# Patient Record
Sex: MALE | Race: WHITE | NOT HISPANIC OR LATINO | Employment: FULL TIME | URBAN - METROPOLITAN AREA
[De-identification: names, ages, dates, MRNs, and addresses within clinical notes are randomized per-mention and may not be internally consistent; named-entity substitution may affect disease eponyms.]

---

## 2017-04-24 ENCOUNTER — OFFICE VISIT (OUTPATIENT)
Dept: MEDICAL GROUP | Facility: MEDICAL CENTER | Age: 42
End: 2017-04-24
Payer: COMMERCIAL

## 2017-04-24 VITALS
RESPIRATION RATE: 16 BRPM | HEART RATE: 65 BPM | TEMPERATURE: 97.7 F | HEIGHT: 72 IN | OXYGEN SATURATION: 94 % | DIASTOLIC BLOOD PRESSURE: 70 MMHG | SYSTOLIC BLOOD PRESSURE: 112 MMHG | BODY MASS INDEX: 25.35 KG/M2 | WEIGHT: 187.2 LBS

## 2017-04-24 DIAGNOSIS — M79.644 FINGER PAIN, RIGHT: ICD-10-CM

## 2017-04-24 DIAGNOSIS — K20.0 EOSINOPHILIC ESOPHAGITIS: ICD-10-CM

## 2017-04-24 DIAGNOSIS — F32.A DEPRESSION, UNSPECIFIED DEPRESSION TYPE: ICD-10-CM

## 2017-04-24 DIAGNOSIS — J45.30 MILD PERSISTENT ASTHMA WITHOUT COMPLICATION: ICD-10-CM

## 2017-04-24 PROCEDURE — 99214 OFFICE O/P EST MOD 30 MIN: CPT | Performed by: PHYSICIAN ASSISTANT

## 2017-04-24 RX ORDER — ALBUTEROL SULFATE 90 UG/1
2 AEROSOL, METERED RESPIRATORY (INHALATION) EVERY 4 HOURS PRN
Qty: 8.5 G | Refills: 3 | Status: SHIPPED | OUTPATIENT
Start: 2017-04-24 | End: 2017-10-16

## 2017-04-24 NOTE — ASSESSMENT & PLAN NOTE
Removed a splinter from his right hand of his third digit about a year ago. Since then he's had intermittent issues with pain. Discomfort when grabbing things. Has been soaking the finger in Epsom salts. His concern is there is possibly a splinter in the finger.

## 2017-04-24 NOTE — PROGRESS NOTES
Subjective:   Raheel Kitchen Jr. is a 41 y.o. male here today to establish care with a new PCP in our office as well as to discuss possible recent issues with a depressed date.    Finger pain, right  Removed a splinter from his right hand of his third digit about a year ago. Since then he's had intermittent issues with pain. Discomfort when grabbing things. Has been soaking the finger in Epsom salts. His concern is there is possibly a splinter in the finger.    Depression  This is a 41-year-old male complains of a 2-3 week history of feeling sad. Symptoms came on out of the blue. He states that last week he had couple days where he didn't get out of bed. His wife called it laziness. States as a heaviness in his thoughts. He does go to work and he did mow the lawn this weekend. Appeared that on Thursday that this cloud was lifted from him. He has no history of significant depression or anxiety. Denies any homicidal or suicidal ideations. He is  with 3 children. States he has no reason to feel this way. No reason to feel unhappy. He has felt unhappy though for a while. He spoke to his father recently and his mother had depression. He did make an appointment with a behavioral health counselor or possibly a psychologist. Wanted to, to see if this was more of a physical and mental problem. She had labs done in October and they were within normal limits. Had a thyroid level checked which was normal.    Mild persistent asthma  Has a chronic history of asthma that is worse when he is sick. No recent illnesses. He does not have a rescue inhaler any longer. Usually will take Advair as well. Doesn't need to take Advair on a daily basis. Denies any current symptoms such as fevers, chest pain or shortness of breath.    Eosinophilic esophagitis  He followed up with GI and had a dilatation for esophageal stricture. He was told to take omeprazole for 2 months. He has current no symptoms. Feels good. Has no follow-up with GI  until he has symptoms of difficulty swallowing or feels heartburn or acid indigestion.       Current medicines (including changes today)  Current Outpatient Prescriptions   Medication Sig Dispense Refill   • albuterol 108 (90 BASE) MCG/ACT Aero Soln inhalation aerosol Inhale 2 Puffs by mouth every four hours as needed for Shortness of Breath. 8.5 g 3   • therapeutic multivitamin-minerals (THERAGRAN-M) Tab Take 1 Tab by mouth every day.       No current facility-administered medications for this visit.     He  has a past medical history of Esophageal stricture; Esophageal dilatation; Asthma; and Esophageal ring. He also has no past medical history of Diabetes (CMS-[facility]).    ROS   No chest pain, no shortness of breath, no abdominal pain and all other systems were reviewed and are negative.       Objective:     Blood pressure 112/70, pulse 65, temperature 36.5 °C (97.7 °F), resp. rate 16, height 1.829 m (6'), weight 84.913 kg (187 lb 3.2 oz), SpO2 94 %. Body mass index is 25.38 kg/(m^2).   Physical Exam:  Constitutional: Alert, no distress.  Skin: Warm, dry, good turgor, no rashes in visible areas. Right finger there is a lesion that is raised approximately 5 mm in diameter. There are some micro-abrasions noted on the top of this lesion. Nontender. No noted masses other than the skin lesion.  Eye: Equal, round and reactive, conjunctiva clear, lids normal.  ENMT: Lips without lesions, good dentition, oropharynx clear.  Neck: Trachea midline, no masses.   Lymph: No cervical or supraclavicular lymphadenopathy  Respiratory: Unlabored respiratory effort, lungs clear to auscultation, no wheezes, no ronchi.  Cardiovascular: Normal S1, S2, no murmur, no edema.  Abdomen: Soft, non-tender, no masses.  Psych: Alert and oriented x3, normal affect and mood.        Assessment and Plan:   The following treatment plan was discussed    1. Depression, unspecified depression type  New onset, acute condition. Follow-up with psychology.  Discussed with contacting me if symptoms become a daily concern. Then we will start oral medications.    2. Mild persistent asthma without complication  Chronic condition and stable. Prescribed albuterol rescue inhaler use as needed. May contact me if Advair is necessary. Discussed that being a maintenance drug.    3. Eosinophilic esophagitis  Chronic condition with resolution status post dilatation. Follow-up with any concerns such as dysphagia with me or his GI.    4. Finger pain, right  Status post splinter removal. There may be a splinter but would like to see the area heal first. Advised to use a cream and allow the sores on top to heal. If needed return and will perform an exploratory procedure.      Followup: Return if symptoms worsen or fail to improve.    Please note that this dictation was created using voice recognition software. I have made every reasonable attempt to correct obvious errors, but I expect that there are errors of grammar and possibly content that I did not discover before finalizing the note.

## 2017-04-24 NOTE — MR AVS SNAPSHOT
Raheel Kitchen    2017 7:00 AM   Office Visit   MRN: [id number]    Department:  [place] [address]   Dept Phone:  [phone]    Description:  Male : 1975   Provider:  Toan Mcelroy PA-C           Reason for Visit     Establish Care mental concerns, splinter on R hand 3rd digit       Allergies as of 2017     No Known Allergies      You were diagnosed with     Depression, unspecified depression type   [7464623]       Mild persistent asthma without complication   [783283]       Finger pain, right   [509165]       Eosinophilic esophagitis   [530.13.ICD-9-CM]         Vital Signs     Blood Pressure Pulse Temperature Respirations Height Weight    112/70 mmHg 65 36.5 °C (97.7 °F) 16 1.829 m (6') 84.913 kg (187 lb 3.2 oz)    Body Mass Index Oxygen Saturation Smoking Status             25.38 kg/m2 94% Never Smoker          Basic Information     Date Of Birth Sex Race Ethnicity Preferred Language    1975 Male White Non- English      Your appointments     Oct 09, 2017  7:00 AM   ANNUAL EXAM PREVENTATIVE with Toan Mcelroy PA-C   [facility]    [address]  [address]  [place] [address] [phone]              Problem List              ICD-10-CM Priority Class Noted - Resolved    Eosinophilic esophagitis K20.0   10/7/2016 - Present    Mild persistent asthma J45.30   10/7/2016 - Present    Health care maintenance Z00.00   10/7/2016 - Present    Depression F32.9   2017 - Present    Finger pain, right M79.644   2017 - Present      Health Maintenance        Date Due Completion Dates    IMM DTaP/Tdap/Td Vaccine (1 - Tdap) 1992 ---    IMM PNEUMOCOCCAL 19-64 (ADULT) MEDIUM RISK SERIES (1 of 1 - PPSV23) 1992 ---            Current Immunizations     No immunizations on file.      Below and/or attached are the medications your provider expects you to take. Review all of your home medications and newly ordered  medications with your provider and/or pharmacist. Follow medication instructions as directed by your provider and/or pharmacist. Please keep your medication list with you and share with your provider. Update the information when medications are discontinued, doses are changed, or new medications (including over-the-counter products) are added; and carry medication information at all times in the event of emergency situations     Allergies:  No Known Allergies          Medications  Valid as of: April 24, 2017 -  7:35 AM    Generic Name Brand Name Tablet Size Instructions for use    Albuterol Sulfate (Aero Soln) albuterol 108 (90 BASE) MCG/ACT Inhale 2 Puffs by mouth every four hours as needed for Shortness of Breath.        Multiple Vitamins-Minerals (Tab) THERAGRAN-M  Take 1 Tab by mouth every day.        .                 Medicines prescribed today were sent to:     [facility] [address] [place] [address], [place] - [address]    [address] [place] [address]    Phone: [phone] Fax: [phone]    Open 24 Hours?: No      Medication refill instructions:       If your prescription bottle indicates you have medication refills left, it is not necessary to call your provider’s office. Please contact your pharmacy and they will refill your medication.    If your prescription bottle indicates you do not have any refills left, you may request refills at any time through one of the following ways: The online [employer] system (except Urgent Care), by calling your provider’s office, or by asking your pharmacy to contact your provider’s office with a refill request. Medication refills are processed only during regular business hours and may not be available until the next business day. Your provider may request additional information or to have a follow-up visit with you prior to refilling your medication.   *Please Note: Medication refills are assigned a new Rx number when refilled electronically. Your pharmacy may  indicate that no refills were authorized even though a new prescription for the same medication is available at the pharmacy. Please request the medicine by name with the pharmacy before contacting your provider for a refill.           [employer] Access Code: Activation code not generated  Current [employer] Status: Active

## 2017-04-24 NOTE — ASSESSMENT & PLAN NOTE
He followed up with GI and had a dilatation for esophageal stricture. He was told to take omeprazole for 2 months. He has current no symptoms. Feels good. Has no follow-up with GI until he has symptoms of difficulty swallowing or feels heartburn or acid indigestion.

## 2017-04-24 NOTE — ASSESSMENT & PLAN NOTE
Has a chronic history of asthma that is worse when he is sick. No recent illnesses. He does not have a rescue inhaler any longer. Usually will take Advair as well. Doesn't need to take Advair on a daily basis. Denies any current symptoms such as fevers, chest pain or shortness of breath.

## 2017-04-24 NOTE — ASSESSMENT & PLAN NOTE
This is a 41-year-old male complains of a 2-3 week history of feeling sad. Symptoms came on out of the blue. He states that last week he had couple days where he didn't get out of bed. His wife called it laziness. States as a heaviness in his thoughts. He does go to work and he did mow the lawn this weekend. Appeared that on Thursday that this cloud was lifted from him. He has no history of significant depression or anxiety. Denies any homicidal or suicidal ideations. He is  with 3 children. States he has no reason to feel this way. No reason to feel unhappy. He has felt unhappy though for a while. He spoke to his father recently and his mother had depression. He did make an appointment with a behavioral health counselor or possibly a psychologist. Wanted to, to see if this was more of a physical and mental problem. She had labs done in October and they were within normal limits. Had a thyroid level checked which was normal.

## 2017-05-30 ENCOUNTER — OFFICE VISIT (OUTPATIENT)
Dept: MEDICAL GROUP | Facility: MEDICAL CENTER | Age: 42
End: 2017-05-30
Payer: COMMERCIAL

## 2017-05-30 VITALS
DIASTOLIC BLOOD PRESSURE: 74 MMHG | BODY MASS INDEX: 24.78 KG/M2 | HEART RATE: 65 BPM | HEIGHT: 72 IN | OXYGEN SATURATION: 94 % | WEIGHT: 182.98 LBS | TEMPERATURE: 97.2 F | RESPIRATION RATE: 16 BRPM | SYSTOLIC BLOOD PRESSURE: 110 MMHG

## 2017-05-30 DIAGNOSIS — F32.A DEPRESSION, UNSPECIFIED DEPRESSION TYPE: ICD-10-CM

## 2017-05-30 PROCEDURE — 99214 OFFICE O/P EST MOD 30 MIN: CPT | Performed by: PHYSICIAN ASSISTANT

## 2017-05-30 RX ORDER — ESCITALOPRAM OXALATE 10 MG/1
10 TABLET ORAL DAILY
Qty: 30 TAB | Refills: 0 | Status: SHIPPED | OUTPATIENT
Start: 2017-05-30 | End: 2017-06-12 | Stop reason: SDUPTHER

## 2017-05-30 NOTE — MR AVS SNAPSHOT
Raheel Kitchen    2017 11:00 AM   Office Visit   MRN: [id number]    Department:  [place] [address]   Dept Phone:  [phone]    Description:  Male : 1975   Provider:  Jesus Aragon PA-C           Reason for Visit     Mental Heatlh Problem           Allergies as of 2017     No Known Allergies      You were diagnosed with     Depression, unspecified depression type   [[id number]]         Vital Signs     Blood Pressure Pulse Temperature Respirations Height Weight    110/74 mmHg 65 36.2 °C (97.2 °F) 16 1.829 m (6') 83 kg (182 lb 15.7 oz)    Body Mass Index Oxygen Saturation Smoking Status             24.81 kg/m2 94% Never Smoker          Basic Information     Date Of Birth Sex Race Ethnicity Preferred Language    1975 Male White Non- English      Your appointments     2017  3:00 PM   Established Patient with Jesus Aragon PA-C   [facility]    [address]  [address]  [place] [id number] [phone]           You will be receiving a confirmation call a few days before your appointment from our automated call confirmation system.            Oct 09, 2017  7:00 AM   ANNUAL EXAM PREVENTATIVE with Toan Mcelroy PA-C   [facility]    [address]  [address]  [place] [id number] [phone]              Problem List              ICD-10-CM Priority Class Noted - Resolved    Eosinophilic esophagitis K20.0   10/7/2016 - Present    Mild persistent asthma J45.30   10/7/2016 - Present    Health care maintenance Z00.00   10/7/2016 - Present    Depression F32.9   2017 - Present    Finger pain, right M79.644   2017 - Present      Health Maintenance        Date Due Completion Dates    IMM DTaP/Tdap/Td Vaccine (1 - Tdap) 1992 ---    IMM PNEUMOCOCCAL 19-64 (ADULT) MEDIUM RISK SERIES (1 of 1 - PPSV23) 1992 ---            Current Immunizations     No  immunizations on file.      Below and/or attached are the medications your provider expects you to take. Review all of your home medications and newly ordered medications with your provider and/or pharmacist. Follow medication instructions as directed by your provider and/or pharmacist. Please keep your medication list with you and share with your provider. Update the information when medications are discontinued, doses are changed, or new medications (including over-the-counter products) are added; and carry medication information at all times in the event of emergency situations     Allergies:  No Known Allergies          Medications  Valid as of: May 30, 2017 - 11:28 AM    Generic Name Brand Name Tablet Size Instructions for use    Albuterol Sulfate (Aero Soln) albuterol 108 (90 BASE) MCG/ACT Inhale 2 Puffs by mouth every four hours as needed for Shortness of Breath.        Escitalopram Oxalate (Tab) LEXAPRO 10 MG Take 1 Tab by mouth every day.        Multiple Vitamins-Minerals (Tab) THERAGRAN-M  Take 1 Tab by mouth every day.        .                 Medicines prescribed today were sent to:     [id number] - [place], [place] - [address]    [address] [place] [address]    Phone: [phone] Fax: [phone]    Open 24 Hours?: No      Medication refill instructions:       If your prescription bottle indicates you have medication refills left, it is not necessary to call your provider’s office. Please contact your pharmacy and they will refill your medication.    If your prescription bottle indicates you do not have any refills left, you may request refills at any time through one of the following ways: The online [employer] system (except Urgent Care), by calling your provider’s office, or by asking your pharmacy to contact your provider’s office with a refill request. Medication refills are processed only during regular business hours and may not be available until the next business day. Your  provider may request additional information or to have a follow-up visit with you prior to refilling your medication.   *Please Note: Medication refills are assigned a new Rx number when refilled electronically. Your pharmacy may indicate that no refills were authorized even though a new prescription for the same medication is available at the pharmacy. Please request the medicine by name with the pharmacy before contacting your provider for a refill.        Instructions    Escitalopram tablets  What is this medicine?  ESCITALOPRAM (es sye DILCIA oh pram) is used to treat depression and certain types of anxiety.  This medicine may be used for other purposes; ask your health care provider or pharmacist if you have questions.  COMMON BRAND NAME(S): Lexapro  What should I tell my health care provider before I take this medicine?  They need to know if you have any of these conditions:  -bipolar disorder or a family history of bipolar disorder  -diabetes  -heart disease  -kidney or liver disease  -receiving electroconvulsive therapy  -seizures (convulsions)  -suicidal thoughts, plans, or attempt by you or a family member  -an unusual or allergic reaction to escitalopram, the related drug citalopram, other medicines, foods, dyes, or preservatives  -pregnant or trying to become pregnant  -breast-feeding  How should I use this medicine?  Take this medicine by mouth with a glass of water. Follow the directions on the prescription label. You can take it with or without food. If it upsets your stomach, take it with food. Take your medicine at regular intervals. Do not take it more often than directed. Do not stop taking this medicine suddenly except upon the advice of your doctor. Stopping this medicine too quickly may cause serious side effects or your condition may worsen.  A special MedGuide will be given to you by the pharmacist with each prescription and refill. Be sure to read this information carefully each time.  Talk to  your pediatrician regarding the use of this medicine in children. Special care may be needed.  Overdosage: If you think you have taken too much of this medicine contact a poison control center or emergency room at once.  NOTE: This medicine is only for you. Do not share this medicine with others.  What if I miss a dose?  If you miss a dose, take it as soon as you can. If it is almost time for your next dose, take only that dose. Do not take double or extra doses.  What may interact with this medicine?  Do not take this medicine with any of the following medications:  -cisapride  -citalopram  -linezolid  -MAOIs like Carbex, Eldepryl, Marplan, Nardil, and Parnate  -methylene blue (injected into a vein)  -pimozide  This medicine may also interact with the following medications:  -alcohol  -aspirin and aspirin-like medicines  -carbamazepine  -certain medicines for depression, anxiety, or psychotic disturbances  -certain medicines for migraine headache like almotriptan, eletriptan, frovatriptan, naratriptan, rizatriptan, sumatriptan, zolmitriptan  -cimetidine  -diuretics  -fentanyl  -furazolidone  -isoniazid  -ketoconazole  -lithium  -medicines that treat or prevent blood clots like warfarin, enoxaparin, and dalteparin  -medicines for sleep  -metoprolol  -NSAIDs, medicines for pain and inflammation, like ibuprofen or naproxen  -procarbazine  -rasagiline  -supplements like Progress's wort, kava kava, valerian  -tramadol  -tryptophan  This list may not describe all possible interactions. Give your health care provider a list of all the medicines, herbs, non-prescription drugs, or dietary supplements you use. Also tell them if you smoke, drink alcohol, or use illegal drugs. Some items may interact with your medicine.  What should I watch for while using this medicine?  Tell your doctor if your symptoms do not get better or if they get worse. Visit your doctor or health care professional for regular checks on your progress.  Because it may take several weeks to see the full effects of this medicine, it is important to continue your treatment as prescribed by your doctor.  Patients and their families should watch out for new or worsening thoughts of suicide or depression. Also watch out for sudden changes in feelings such as feeling anxious, agitated, panicky, irritable, hostile, aggressive, impulsive, severely restless, overly excited and hyperactive, or not being able to sleep. If this happens, especially at the beginning of treatment or after a change in dose, call your health care professional.  You may get drowsy or dizzy. Do not drive, use machinery, or do anything that needs mental alertness until you know how this medicine affects you. Do not stand or sit up quickly, especially if you are an older patient. This reduces the risk of dizzy or fainting spells. Alcohol may interfere with the effect of this medicine. Avoid alcoholic drinks.  Your mouth may get dry. Chewing sugarless gum or sucking hard candy, and drinking plenty of water may help. Contact your doctor if the problem does not go away or is severe.  What side effects may I notice from receiving this medicine?  Side effects that you should report to your doctor or health care professional as soon as possible:  -allergic reactions like skin rash, itching or hives, swelling of the face, lips, or tongue  -confusion  -feeling faint or lightheaded, falls  -fast talking and excited feelings or actions that are out of control  -hallucination, loss of contact with reality  -seizures  -suicidal thoughts or other mood changes  -unusual bleeding or bruising  Side effects that usually do not require medical attention (report to your doctor or health care professional if they continue or are bothersome):  -blurred vision  -changes in appetite  -change in sex drive or performance  -headache  -increased sweating  -nausea  This list may not describe all possible side effects. Call your  doctor for medical advice about side effects. You may report side effects to FDA at 1-495-QDW-9041.  Where should I keep my medicine?  Keep out of reach of children.  Store at room temperature between 15 and 30 degrees C (59 and 86 degrees F). Throw away any unused medicine after the expiration date.  NOTE: This sheet is a summary. It may not cover all possible information. If you have questions about this medicine, talk to your doctor, pharmacist, or health care provider.  © 2014, Elsevier/Gold Standard. (5/3/2013 7:12:08 PM)            [employer] Access Code: Activation code not generated  Current registracija vozila Status: Active

## 2017-05-30 NOTE — ASSESSMENT & PLAN NOTE
Patient states positive history of increasingly worsening depression that has been going on since March 2017. Patient states that he's had issues with regards to lack of energy, lack of motivation, decreased interest in doing pleasurable tasks. Patient states that he did follow up with PCP April 24, 2017. Patient was referred to behavioral health. She mentions she developed a psychologist Lara Long. Patient states that no diagnosis has been formally made. Patient mentions no recommendations were made. Patient states positive increased sleep and fatigue. Patient states lack of appetite. Positive suicidal ideations, no specific plan. Denies homicidal ideations. No auditory, visual or tactile hallucinations.

## 2017-05-30 NOTE — PATIENT INSTRUCTIONS
Escitalopram tablets  What is this medicine?  ESCITALOPRAM (es sye DILCIA oh pram) is used to treat depression and certain types of anxiety.  This medicine may be used for other purposes; ask your health care provider or pharmacist if you have questions.  COMMON BRAND NAME(S): Lexapro  What should I tell my health care provider before I take this medicine?  They need to know if you have any of these conditions:  -bipolar disorder or a family history of bipolar disorder  -diabetes  -heart disease  -kidney or liver disease  -receiving electroconvulsive therapy  -seizures (convulsions)  -suicidal thoughts, plans, or attempt by you or a family member  -an unusual or allergic reaction to escitalopram, the related drug citalopram, other medicines, foods, dyes, or preservatives  -pregnant or trying to become pregnant  -breast-feeding  How should I use this medicine?  Take this medicine by mouth with a glass of water. Follow the directions on the prescription label. You can take it with or without food. If it upsets your stomach, take it with food. Take your medicine at regular intervals. Do not take it more often than directed. Do not stop taking this medicine suddenly except upon the advice of your doctor. Stopping this medicine too quickly may cause serious side effects or your condition may worsen.  A special MedGuide will be given to you by the pharmacist with each prescription and refill. Be sure to read this information carefully each time.  Talk to your pediatrician regarding the use of this medicine in children. Special care may be needed.  Overdosage: If you think you have taken too much of this medicine contact a poison control center or emergency room at once.  NOTE: This medicine is only for you. Do not share this medicine with others.  What if I miss a dose?  If you miss a dose, take it as soon as you can. If it is almost time for your next dose, take only that dose. Do not take double or extra doses.  What may  interact with this medicine?  Do not take this medicine with any of the following medications:  -cisapride  -citalopram  -linezolid  -MAOIs like Carbex, Eldepryl, Marplan, Nardil, and Parnate  -methylene blue (injected into a vein)  -pimozide  This medicine may also interact with the following medications:  -alcohol  -aspirin and aspirin-like medicines  -carbamazepine  -certain medicines for depression, anxiety, or psychotic disturbances  -certain medicines for migraine headache like almotriptan, eletriptan, frovatriptan, naratriptan, rizatriptan, sumatriptan, zolmitriptan  -cimetidine  -diuretics  -fentanyl  -furazolidone  -isoniazid  -ketoconazole  -lithium  -medicines that treat or prevent blood clots like warfarin, enoxaparin, and dalteparin  -medicines for sleep  -metoprolol  -NSAIDs, medicines for pain and inflammation, like ibuprofen or naproxen  -procarbazine  -rasagiline  -supplements like [place]'s wort, kava kava, valerian  -tramadol  -tryptophan  This list may not describe all possible interactions. Give your health care provider a list of all the medicines, herbs, non-prescription drugs, or dietary supplements you use. Also tell them if you smoke, drink alcohol, or use illegal drugs. Some items may interact with your medicine.  What should I watch for while using this medicine?  Tell your doctor if your symptoms do not get better or if they get worse. Visit your doctor or health care professional for regular checks on your progress. Because it may take several weeks to see the full effects of this medicine, it is important to continue your treatment as prescribed by your doctor.  Patients and their families should watch out for new or worsening thoughts of suicide or depression. Also watch out for sudden changes in feelings such as feeling anxious, agitated, panicky, irritable, hostile, aggressive, impulsive, severely restless, overly excited and hyperactive, or not being able to sleep. If this  happens, especially at the beginning of treatment or after a change in dose, call your health care professional.  You may get drowsy or dizzy. Do not drive, use machinery, or do anything that needs mental alertness until you know how this medicine affects you. Do not stand or sit up quickly, especially if you are an older patient. This reduces the risk of dizzy or fainting spells. Alcohol may interfere with the effect of this medicine. Avoid alcoholic drinks.  Your mouth may get dry. Chewing sugarless gum or sucking hard candy, and drinking plenty of water may help. Contact your doctor if the problem does not go away or is severe.  What side effects may I notice from receiving this medicine?  Side effects that you should report to your doctor or health care professional as soon as possible:  -allergic reactions like skin rash, itching or hives, swelling of the face, lips, or tongue  -confusion  -feeling faint or lightheaded, falls  -fast talking and excited feelings or actions that are out of control  -hallucination, loss of contact with reality  -seizures  -suicidal thoughts or other mood changes  -unusual bleeding or bruising  Side effects that usually do not require medical attention (report to your doctor or health care professional if they continue or are bothersome):  -blurred vision  -changes in appetite  -change in sex drive or performance  -headache  -increased sweating  -nausea  This list may not describe all possible side effects. Call your doctor for medical advice about side effects. You may report side effects to FDA at 3-563-FDA-8648.  Where should I keep my medicine?  Keep out of reach of children.  Store at room temperature between 15 and 30 degrees C (59 and 86 degrees F). Throw away any unused medicine after the expiration date.  NOTE: This sheet is a summary. It may not cover all possible information. If you have questions about this medicine, talk to your doctor, pharmacist, or health care  provider.  © 2014, [employer]. (5/3/2013 7:12:08 PM)

## 2017-05-30 NOTE — PROGRESS NOTES
Chief Complaint   Patient presents with   • Mental Heatlh Problem       HISTORY OF PRESENT ILLNESS: Patient is a 42 y.o. male established patient who presents today to discuss depression    Depression  Patient states positive history of increasingly worsening depression that has been going on since March 2017. Patient states that he's had issues with regards to lack of energy, lack of motivation, decreased interest in doing pleasurable tasks. Patient states that he did follow up with PCP April 24, 2017. Patient was referred to behavioral health. She mentions she developed a psychologist Lara Long. Patient states that no diagnosis has been formally made. Patient mentions no recommendations were made. Patient states positive increased sleep and fatigue. Patient states lack of appetite. Positive suicidal ideations, no specific plan. Denies homicidal ideations. No auditory, visual or tactile hallucinations.     Patient Active Problem List    Diagnosis Date Noted   • Depression 04/24/2017   • Finger pain, right 04/24/2017   • Eosinophilic esophagitis 10/07/2016   • Mild persistent asthma 10/07/2016   • Health care maintenance 10/07/2016       Allergies:Review of patient's allergies indicates no known allergies.    Current Outpatient Prescriptions   Medication Sig Dispense Refill   • escitalopram (LEXAPRO) 10 MG Tab Take 1 Tab by mouth every day. 30 Tab 0   • albuterol 108 (90 BASE) MCG/ACT Aero Soln inhalation aerosol Inhale 2 Puffs by mouth every four hours as needed for Shortness of Breath. 8.5 g 3   • therapeutic multivitamin-minerals (THERAGRAN-M) Tab Take 1 Tab by mouth every day.       No current facility-administered medications for this visit.       Social History   Substance Use Topics   • Smoking status: Never Smoker    • Smokeless tobacco: Never Used   • Alcohol Use: 0.0 oz/week     0 Standard drinks or equivalent per week      Comment: rare        Family Status   Relation Status Death Age   • Mother      • Father Alive      Family History   Problem Relation Age of Onset   • Cancer Mother      pancreatic cancer   • Heart Disease Father        Review of Systems:   Constitutional: Negative for fever, chills, weight loss and malaise/fatigue.   HENT: Negative for ear pain, nosebleeds, congestion, sore throat and neck pain.    Eyes: Negative for blurred vision.   Respiratory: Negative for cough, sputum production, shortness of breath and wheezing.    Cardiovascular: Negative for chest pain, palpitations, orthopnea and leg swelling.   Gastrointestinal: Negative for heartburn, nausea, vomiting and abdominal pain.   Genitourinary: Negative for dysuria, urgency and frequency.   Musculoskeletal: Negative for myalgias, back pain and joint pain.   Skin: Negative for rash and itching.   Neurological: Negative for dizziness, tingling, tremors, sensory change, focal weakness and headaches.   Endo/Heme/Allergies: Does not bruise/bleed easily.   Psychiatric/Behavioral: Positive for depression. Negative memory loss.  The patient is not nervous/anxious and does not have insomnia.  . Positive mild suicidal ideations.   All other systems reviewed and are negative except as in HPI.    Exam:  Blood pressure 110/74, pulse 65, temperature 36.2 °C (97.2 °F), resp. rate 16, height 1.829 m (6'), weight 83 kg (182 lb 15.7 oz), SpO2 94 %.  General:  Well nourished, well developed male in NAD  Head: is grossly normal.  Neck: Supple without JVD or bruit. Thyroid is not enlarged.  Pulmonary: Clear to ausculation. Normal effort. No rales, ronchi, or wheezing.  Cardiovascular: Regular rate and rhythm without murmur. Carotid and radial pulses are intact and equal bilaterally.  Extremities: no clubbing, cyanosis, or edema.  Psychiatric: Positive depression. Positive tearful during evaluation. Slightly flat affect.    Medical decision-making and discussion: 41-year-old male presents to clinic to discuss depression. At such time to discuss  further treatment options and patient wishes medicinal management and intervention. Patient does present to clinic today with his wife who is a positive social support. Discussed face treatment options and I'm going to initiate Lexapro 10 mg once a day for the next 7-14 days. Reevaluation in such time for continuation of medication if deemed appropriate. Discussed this patient over the next few weeks until we have patient on the proper medication and dosage will be reevaluating every 1-2 weeks. In the interim patient is advised to go to the emergency room if symptoms are getting worse.     Please note that this dictation was created using voice recognition software. I have made every reasonable attempt to correct obvious errors, but I expect that there are errors of grammar and possibly content that I did not discover before finalizing the note.    Assessment/Plan:  1. Depression, unspecified depression type  escitalopram (LEXAPRO) 10 MG Tab

## 2017-06-12 ENCOUNTER — OFFICE VISIT (OUTPATIENT)
Dept: MEDICAL GROUP | Facility: MEDICAL CENTER | Age: 42
End: 2017-06-12
Payer: COMMERCIAL

## 2017-06-12 VITALS
OXYGEN SATURATION: 96 % | HEART RATE: 74 BPM | DIASTOLIC BLOOD PRESSURE: 86 MMHG | RESPIRATION RATE: 12 BRPM | WEIGHT: 186.07 LBS | BODY MASS INDEX: 25.2 KG/M2 | HEIGHT: 72 IN | TEMPERATURE: 97.6 F | SYSTOLIC BLOOD PRESSURE: 110 MMHG

## 2017-06-12 DIAGNOSIS — F32.A DEPRESSION, UNSPECIFIED DEPRESSION TYPE: ICD-10-CM

## 2017-06-12 PROCEDURE — 99214 OFFICE O/P EST MOD 30 MIN: CPT | Performed by: PHYSICIAN ASSISTANT

## 2017-06-12 RX ORDER — ESCITALOPRAM OXALATE 10 MG/1
15 TABLET ORAL DAILY
Qty: 45 TAB | Refills: 1 | Status: SHIPPED | OUTPATIENT
Start: 2017-06-12 | End: 2017-08-22 | Stop reason: SDUPTHER

## 2017-06-12 NOTE — ASSESSMENT & PLAN NOTE
Patient states positive depression. Patient states depression may have been there since January 2017, possibly even significantly longer. Patient states positive significant family history of depression. During a previous encounter and discussion May 30, 2017 patient was started on Lexapro 10 mg daily basis. She states the first week patient took medication he did notice that he was feeling slightly better, did have more energy. Patient states during the second week of taking medication patient states he was no longer dwelling on certain issues. Patient states that he has been able to handle more daily activities with ease and able to work through a lot of activities significantly concur. Patient states previously he was swelling on specific issues for significant period of time. Patient states now he is able to move through them very time efficient manner. In discussion with patient states no homicidal or suicidal ideations. Previously positive suicidal ideations, with no specific plan. Currently states doing better with no plan, nor no suicidal ideations. In discussion with patient states his brother also is on Lexapro. Patient states that he also discussed with a family member that is a physician who states medication is an excellent choice for patient's medical condition. Patient states since being seen last 2 weeks ago also is noted that a number of family members that have anxiety and depression are also being medicinally managed. Patient states that he would like to have medication increased.

## 2017-06-12 NOTE — PROGRESS NOTES
Chief Complaint   Patient presents with   • Follow-Up       HISTORY OF PRESENT ILLNESS: Patient is a 42 y.o. male established patient who presents today to discuss anxiety depression    Depression  Patient states positive depression. Patient states depression may have been there since January 2017, possibly even significantly longer. Patient states positive significant family history of depression. During a previous encounter and discussion May 30, 2017 patient was started on Lexapro 10 mg daily basis. She states the first week patient took medication he did notice that he was feeling slightly better, did have more energy. Patient states during the second week of taking medication patient states he was no longer dwelling on certain issues. Patient states that he has been able to handle more daily activities with ease and able to work through a lot of activities significantly concur. Patient states previously he was swelling on specific issues for significant period of time. Patient states now he is able to move through them very time efficient manner. In discussion with patient states no homicidal or suicidal ideations. Previously positive suicidal ideations, with no specific plan. Currently states doing better with no plan, nor no suicidal ideations. In discussion with patient states his brother also is on Lexapro. Patient states that he also discussed with a family member that is a physician who states medication is an excellent choice for patient's medical condition. Patient states since being seen last 2 weeks ago also is noted that a number of family members that have anxiety and depression are also being medicinally managed. Patient states that he would like to have medication increased.       Patient Active Problem List    Diagnosis Date Noted   • Depression 04/24/2017   • Finger pain, right 04/24/2017   • Eosinophilic esophagitis 10/07/2016   • Mild persistent asthma 10/07/2016   • Health care maintenance  10/07/2016       Allergies:Review of patient's allergies indicates no known allergies.    Current Outpatient Prescriptions   Medication Sig Dispense Refill   • escitalopram (LEXAPRO) 10 MG Tab Take 1.5 Tabs by mouth every day. 45 Tab 1   • albuterol 108 (90 BASE) MCG/ACT Aero Soln inhalation aerosol Inhale 2 Puffs by mouth every four hours as needed for Shortness of Breath. 8.5 g 3   • therapeutic multivitamin-minerals (THERAGRAN-M) Tab Take 1 Tab by mouth every day.       No current facility-administered medications for this visit.       Social History   Substance Use Topics   • Smoking status: Never Smoker    • Smokeless tobacco: Never Used   • Alcohol Use: 0.0 oz/week     0 Standard drinks or equivalent per week      Comment: rare        Family Status   Relation Status Death Age   • Mother     • Father Alive      Family History   Problem Relation Age of Onset   • Cancer Mother      pancreatic cancer   • Heart Disease Father        Review of Systems:   Constitutional: Negative for fever, chills, weight loss and malaise/fatigue.   HENT: Negative for ear pain, nosebleeds, congestion, sore throat and neck pain.    Eyes: Negative for blurred vision.   Respiratory: Negative for cough, sputum production, shortness of breath and wheezing.    Cardiovascular: Negative for chest pain, palpitations, orthopnea and leg swelling.   Gastrointestinal: Negative for heartburn, nausea, vomiting and abdominal pain.   Genitourinary: Negative for dysuria, urgency and frequency.   Musculoskeletal: Negative for myalgias, back pain and joint pain.   Skin: Negative for rash and itching.   Neurological: Negative for dizziness, tingling, tremors, sensory change, focal weakness and headaches.   Endo/Heme/Allergies: Does not bruise/bleed easily.   Psychiatric/Behavioral: Improved for depression. Denies suicidal ideas and memory loss.  The patient is improved for nervous/anxious and does not have insomnia.    All other systems reviewed  "and are negative except as in HPI.    Exam:  Blood pressure 110/86, pulse 74, temperature 36.4 °C (97.6 °F), resp. rate 12, height 1.829 m (6' 0.01\"), weight 84.4 kg (186 lb 1.1 oz), SpO2 96 %.  General:  Well nourished, well developed male in NAD  Head: is grossly normal.  Neck: Supple without JVD or bruit. Thyroid is not enlarged.  Pulmonary: Clear to ausculation. Normal effort. No rales, ronchi, or wheezing.  Cardiovascular: Regular rate and rhythm without murmur. Carotid and radial pulses are intact and equal bilaterally.  Extremities: no clubbing, cyanosis, or edema.  Psychiatric: Improved from previous. Good eye contact. No flight of ideas. Dress is appropriate for occasion. Speech is appropriate for occasion. No homicidal or suicidal ideations. Denies auditory, visual or tactile lacerations.    Medical decision-making and discussion: A 42-year-old male presents to clinic today to discuss anxiety depression. Patient doing significantly better and improving. At such time we are going to increase dose to Lexapro 15 mg daily basis. Your precautions given to patient. Advised patient to follow up with therapist    Please note that this dictation was created using voice recognition software. I have made every reasonable attempt to correct obvious errors, but I expect that there are errors of grammar and possibly content that I did not discover before finalizing the note.    Assessment/Plan:  1. Depression, unspecified depression type  escitalopram (LEXAPRO) 10 MG Tab            "

## 2017-06-12 NOTE — MR AVS SNAPSHOT
"        Raheel Kitchen Jr.   2017 3:00 PM   Office Visit   MRN: [id number]    Department:  [place] [address]   Dept Phone:  [phone]    Description:  Male : 1975   Provider:  Jesus Aragon PA-C           Reason for Visit     Follow-Up           Allergies as of 2017     No Known Allergies      You were diagnosed with     Depression, unspecified depression type   [[id number]]         Vital Signs     Blood Pressure Pulse Temperature Respirations Height Weight    110/86 mmHg 74 36.4 °C (97.6 °F) 12 1.829 m (6' 0.01\") 84.4 kg (186 lb 1.1 oz)    Body Mass Index Oxygen Saturation Smoking Status             25.23 kg/m2 96% Never Smoker          Basic Information     Date Of Birth Sex Race Ethnicity Preferred Language    1975 Male White Non- English      Your appointments     Oct 09, 2017  7:00 AM   ANNUAL EXAM PREVENTATIVE with Toan Mcelroy PA-C   [facility]    [address]  [address]  [place] [address]   [phone]              Problem List              ICD-10-CM Priority Class Noted - Resolved    Eosinophilic esophagitis K20.0   10/7/2016 - Present    Mild persistent asthma J45.30   10/7/2016 - Present    Health care maintenance Z00.00   10/7/2016 - Present    Depression F32.9   2017 - Present    Finger pain, right M79.644   2017 - Present      Health Maintenance        Date Due Completion Dates    IMM DTaP/Tdap/Td Vaccine (1 - Tdap) 1992 ---    IMM PNEUMOCOCCAL 19-64 (ADULT) MEDIUM RISK SERIES (1 of 1 - PPSV23) 1992 ---            Current Immunizations     No immunizations on file.      Below and/or attached are the medications your provider expects you to take. Review all of your home medications and newly ordered medications with your provider and/or pharmacist. Follow medication instructions as directed by your provider and/or pharmacist. Please keep your medication list with you and share with your " provider. Update the information when medications are discontinued, doses are changed, or new medications (including over-the-counter products) are added; and carry medication information at all times in the event of emergency situations     Allergies:  No Known Allergies          Medications  Valid as of: June 12, 2017 -  3:51 PM    Generic Name Brand Name Tablet Size Instructions for use    Albuterol Sulfate (Aero Soln) albuterol 108 (90 BASE) MCG/ACT Inhale 2 Puffs by mouth every four hours as needed for Shortness of Breath.        Escitalopram Oxalate (Tab) LEXAPRO 10 MG Take 1.5 Tabs by mouth every day.        Multiple Vitamins-Minerals (Tab) THERAGRAN-M  Take 1 Tab by mouth every day.        .                 Medicines prescribed today were sent to:     [id number] - [place], [place] - [address]    [address] [place] [address]    Phone: [phone] Fax: [phone]    Open 24 Hours?: No      Medication refill instructions:       If your prescription bottle indicates you have medication refills left, it is not necessary to call your provider’s office. Please contact your pharmacy and they will refill your medication.    If your prescription bottle indicates you do not have any refills left, you may request refills at any time through one of the following ways: The online [employer] system (except Urgent Care), by calling your provider’s office, or by asking your pharmacy to contact your provider’s office with a refill request. Medication refills are processed only during regular business hours and may not be available until the next business day. Your provider may request additional information or to have a follow-up visit with you prior to refilling your medication.   *Please Note: Medication refills are assigned a new Rx number when refilled electronically. Your pharmacy may indicate that no refills were authorized even though a new prescription for the same medication is available at the  pharmacy. Please request the medicine by name with the pharmacy before contacting your provider for a refill.           MyChart Access Code: Activation code not generated  Current MyChart Status: Active

## 2017-06-26 RX ORDER — ESCITALOPRAM OXALATE 10 MG/1
TABLET ORAL
Qty: 30 TAB | Refills: 0 | Status: SHIPPED | OUTPATIENT
Start: 2017-06-26 | End: 2017-10-16

## 2017-06-26 NOTE — TELEPHONE ENCOUNTER
Was the patient seen in the last year in this department? Yes     Does patient have an active prescription for medications requested? Yes     Received Request Via: Pharmacy     Last office visit: 06/12/2017  Last labs: 10/07/2016

## 2017-08-22 RX ORDER — ESCITALOPRAM OXALATE 10 MG/1
TABLET ORAL
Qty: 45 TAB | Refills: 3 | Status: SHIPPED | OUTPATIENT
Start: 2017-08-22 | End: 2018-01-02 | Stop reason: SDUPTHER

## 2017-08-22 NOTE — TELEPHONE ENCOUNTER
Was the patient seen in the last year in this department? Yes     Does patient have an active prescription for medications requested? No     Received Request Via: Pharmacy     Last Visit: 6/12/17    Last Labs: 10/7/16

## 2017-09-05 ENCOUNTER — OFFICE VISIT (OUTPATIENT)
Dept: URGENT CARE | Facility: CLINIC | Age: 42
End: 2017-09-05
Payer: COMMERCIAL

## 2017-09-05 VITALS
WEIGHT: 180.8 LBS | HEIGHT: 72 IN | HEART RATE: 78 BPM | BODY MASS INDEX: 24.49 KG/M2 | TEMPERATURE: 97.8 F | DIASTOLIC BLOOD PRESSURE: 86 MMHG | RESPIRATION RATE: 16 BRPM | SYSTOLIC BLOOD PRESSURE: 112 MMHG | OXYGEN SATURATION: 96 %

## 2017-09-05 DIAGNOSIS — H65.01 RIGHT ACUTE SEROUS OTITIS MEDIA, RECURRENCE NOT SPECIFIED: ICD-10-CM

## 2017-09-05 PROCEDURE — 99214 OFFICE O/P EST MOD 30 MIN: CPT | Performed by: PHYSICIAN ASSISTANT

## 2017-09-05 RX ORDER — AMOXICILLIN AND CLAVULANATE POTASSIUM 875; 125 MG/1; MG/1
1 TABLET, FILM COATED ORAL 2 TIMES DAILY
Qty: 14 TAB | Refills: 0 | Status: SHIPPED | OUTPATIENT
Start: 2017-09-05 | End: 2017-09-12

## 2017-09-05 ASSESSMENT — ENCOUNTER SYMPTOMS
CHILLS: 0
HEADACHES: 0
EYE PAIN: 0
FEVER: 0
SORE THROAT: 1
PALPITATIONS: 0
COUGH: 0
SHORTNESS OF BREATH: 0

## 2017-09-05 NOTE — PROGRESS NOTES
Subjective:      Raheel Kitchen Jr. is a 42 y.o. male who presents with Otalgia (right ear pain/ cough X 10 days )            Otalgia    There is pain in the right ear. This is a new problem. The current episode started in the past 7 days. The problem occurs constantly. The problem has been gradually worsening. There has been no fever. The pain is moderate. Associated symptoms include a sore throat. Pertinent negatives include no coughing, ear discharge, headaches or hearing loss. He has tried nothing for the symptoms.       Review of Systems   Constitutional: Negative for chills and fever.   HENT: Positive for ear pain and sore throat. Negative for congestion, ear discharge, hearing loss and tinnitus.    Eyes: Negative for pain.   Respiratory: Negative for cough and shortness of breath.    Cardiovascular: Negative for chest pain and palpitations.   Neurological: Negative for headaches.     All other systems reviewed and are negative.  PMH:  has a past medical history of Asthma; Esophageal dilatation; Esophageal ring; and Esophageal stricture. He also has no past medical history of Diabetes (CMS-[facility]).  MEDS:   Current Outpatient Prescriptions:   •  Pseudoephedrine HCl (SUDAFED 12 HOUR PO), Take  by mouth., Disp: , Rfl:   •  amoxicillin-clavulanate (AUGMENTIN) 875-125 MG Tab, Take 1 Tab by mouth 2 times a day for 7 days., Disp: 14 Tab, Rfl: 0  •  escitalopram (LEXAPRO) 10 MG Tab, TAKE 1 TAB BY MOUTH EVERY DAY., Disp: 30 Tab, Rfl: 0  •  albuterol 108 (90 BASE) MCG/ACT Aero Soln inhalation aerosol, Inhale 2 Puffs by mouth every four hours as needed for Shortness of Breath., Disp: 8.5 g, Rfl: 3  •  therapeutic multivitamin-minerals (THERAGRAN-M) Tab, Take 1 Tab by mouth every day., Disp: , Rfl:   •  escitalopram (LEXAPRO) 10 MG Tab, TAKE 1+1/2 TABLETS BY MOUTH EVERY DAY., Disp: 45 Tab, Rfl: 3  ALLERGIES: No Known Allergies  SURGHX:   Past Surgical History:   Procedure Laterality Date   • GASTROSCOPY WITH FOREIGN BODY  REMOVAL  10/5/2016    Procedure: GASTROSCOPY WITH FOREIGN BODY REMOVAL;  Surgeon: William Mayen M.D.;  Location: SURGERY [facility];  Service:    • APPENDECTOMY       SOCHX:  reports that he has never smoked. He has never used smokeless tobacco. He reports that he drinks alcohol. He reports that he does not use drugs.  FH: Family history was reviewed, no pertinent findings to report  Medications, Allergies, and current problem list reviewed today in Epic       Objective:     /86   Pulse 78   Temp 36.6 °C (97.8 °F)   Resp 16   Ht 1.829 m (6')   Wt 82 kg (180 lb 12.8 oz)   SpO2 96%   BMI 24.52 kg/m²      Physical Exam   Constitutional: He is oriented to person, place, and time. He appears well-developed and well-nourished. He is active.  Non-toxic appearance. He does not have a sickly appearance. He does not appear ill. No distress.   HENT:   Head: Normocephalic and atraumatic.   Right Ear: Hearing, external ear and ear canal normal. A middle ear effusion is present.   Left Ear: Hearing, tympanic membrane, external ear and ear canal normal.   Nose: Nose normal.   Mouth/Throat: Uvula is midline, oropharynx is clear and moist and mucous membranes are normal.   Eyes: Conjunctivae and lids are normal. Right eye exhibits no discharge. Left eye exhibits no discharge.   Neck: Normal range of motion. Neck supple.   Cardiovascular: Regular rhythm and normal heart sounds.  Exam reveals no gallop and no friction rub.    No murmur heard.  Pulmonary/Chest: Effort normal and breath sounds normal. No respiratory distress. He has no decreased breath sounds. He has no wheezes. He has no rhonchi. He has no rales. He exhibits no tenderness.   Musculoskeletal: Normal range of motion.   Neurological: He is alert and oriented to person, place, and time.   Skin: Skin is warm, dry and intact.   Psychiatric: He has a normal mood and affect. His speech is normal and behavior is normal. Judgment and thought content  normal.   Vitals reviewed.              Assessment/Plan:     1. Right acute serous otitis media, recurrence not specified  - flonase/sudafed  - amoxicillin-clavulanate (AUGMENTIN) 875-125 MG Tab; Take 1 Tab by mouth 2 times a day for 7 days.  Dispense: 14 Tab; Refill: 0    Contingent antibiotic prescription given to patient to fill upon meeting criteria of guidelines discussed.   Differential diagnosis, natural history, supportive care, and indications for immediate follow-up discussed at length.   Follow-up with primary care provider within 4-5 days, emergency room precautions discussed.  Patient and/or family appears understanding of information.

## 2017-09-05 NOTE — PATIENT INSTRUCTIONS
Serous Otitis Media  Serous otitis media is fluid in the middle ear space. This space contains the bones for hearing and air. Air in the middle ear space helps to transmit sound.   The air gets there through the eustachian tube. This tube goes from the back of the nose (nasopharynx) to the middle ear space. It keeps the pressure in the middle ear the same as the outside world. It also helps to drain fluid from the middle ear space.  CAUSES   Serous otitis media occurs when the eustachian tube gets blocked. Blockage can come from:  · Ear infections.  · Colds and other upper respiratory infections.  · Allergies.  · Irritants such as cigarette smoke.  · Sudden changes in air pressure (such as descending in an airplane).  · Enlarged adenoids.  · A mass in the nasopharynx.  During colds and upper respiratory infections, the middle ear space can become temporarily filled with fluid. This can happen after an ear infection also. Once the infection clears, the fluid will generally drain out of the ear through the eustachian tube. If it does not, then serous otitis media occurs.  SIGNS AND SYMPTOMS   · Hearing loss.  · A feeling of fullness in the ear, without pain.  · Young children may not show any symptoms but may show slight behavioral changes, such as agitation, ear pulling, or crying.  DIAGNOSIS   Serous otitis media is diagnosed by an ear exam. Tests may be done to check on the movement of the eardrum. Hearing exams may also be done.  TREATMENT   The fluid most often goes away without treatment. If allergy is the cause, allergy treatment may be helpful. Fluid that persists for several months may require minor surgery. A small tube is placed in the eardrum to:  · Drain the fluid.  · Restore the air in the middle ear space.  In certain situations, antibiotic medicines are used to avoid surgery. Surgery may be done to remove enlarged adenoids (if this is the cause).  HOME CARE INSTRUCTIONS   · Keep children away from  tobacco smoke.  · Keep all follow-up visits as directed by your health care provider.  SEEK MEDICAL CARE IF:   · Your hearing is not better in 3 months.  · Your hearing is worse.  · You have ear pain.  · You have drainage from the ear.  · You have dizziness.  · You have serous otitis media only in one ear or have any bleeding from your nose (epistaxis).  · You notice a lump on your neck.  MAKE SURE YOU:  · Understand these instructions.    · Will watch your condition.    · Will get help right away if you are not doing well or get worse.       This information is not intended to replace advice given to you by your health care provider. Make sure you discuss any questions you have with your health care provider.     Document Released: 03/09/2005 Document Revised: 01/08/2016 Document Reviewed: 07/15/2014  [employer] Interactive Patient Education ©2016 [employer]

## 2017-10-16 ENCOUNTER — HOSPITAL ENCOUNTER (OUTPATIENT)
Dept: LAB | Facility: MEDICAL CENTER | Age: 42
End: 2017-10-16
Attending: PHYSICIAN ASSISTANT
Payer: COMMERCIAL

## 2017-10-16 ENCOUNTER — OFFICE VISIT (OUTPATIENT)
Dept: MEDICAL GROUP | Facility: MEDICAL CENTER | Age: 42
End: 2017-10-16
Payer: COMMERCIAL

## 2017-10-16 VITALS
DIASTOLIC BLOOD PRESSURE: 80 MMHG | HEIGHT: 72 IN | WEIGHT: 180 LBS | OXYGEN SATURATION: 96 % | TEMPERATURE: 97.1 F | SYSTOLIC BLOOD PRESSURE: 118 MMHG | HEART RATE: 54 BPM | BODY MASS INDEX: 24.38 KG/M2 | RESPIRATION RATE: 16 BRPM

## 2017-10-16 DIAGNOSIS — J30.1 ACUTE SEASONAL ALLERGIC RHINITIS DUE TO POLLEN: ICD-10-CM

## 2017-10-16 DIAGNOSIS — L98.9 FINGER LESION: ICD-10-CM

## 2017-10-16 DIAGNOSIS — Z00.00 ANNUAL PHYSICAL EXAM: ICD-10-CM

## 2017-10-16 LAB
ALBUMIN SERPL BCP-MCNC: 4.1 G/DL (ref 3.2–4.9)
ALBUMIN/GLOB SERPL: 1.5 G/DL
ALP SERPL-CCNC: 44 U/L (ref 30–99)
ALT SERPL-CCNC: 15 U/L (ref 2–50)
ANION GAP SERPL CALC-SCNC: 7 MMOL/L (ref 0–11.9)
AST SERPL-CCNC: 31 U/L (ref 12–45)
BILIRUB SERPL-MCNC: 1.2 MG/DL (ref 0.1–1.5)
BUN SERPL-MCNC: 14 MG/DL (ref 8–22)
CALCIUM SERPL-MCNC: 9 MG/DL (ref 8.5–10.5)
CHLORIDE SERPL-SCNC: 101 MMOL/L (ref 96–112)
CHOLEST SERPL-MCNC: 169 MG/DL (ref 100–199)
CO2 SERPL-SCNC: 28 MMOL/L (ref 20–33)
CREAT SERPL-MCNC: 0.97 MG/DL (ref 0.5–1.4)
EST. AVERAGE GLUCOSE BLD GHB EST-MCNC: 103 MG/DL
GFR SERPL CREATININE-BSD FRML MDRD: >60 ML/MIN/1.73 M 2
GLOBULIN SER CALC-MCNC: 2.7 G/DL (ref 1.9–3.5)
GLUCOSE SERPL-MCNC: 91 MG/DL (ref 65–99)
HBA1C MFR BLD: 5.2 % (ref 0–5.6)
HDLC SERPL-MCNC: 38 MG/DL
LDLC SERPL CALC-MCNC: 104 MG/DL
POTASSIUM SERPL-SCNC: 3.9 MMOL/L (ref 3.6–5.5)
PROT SERPL-MCNC: 6.8 G/DL (ref 6–8.2)
SODIUM SERPL-SCNC: 136 MMOL/L (ref 135–145)
TRIGL SERPL-MCNC: 137 MG/DL (ref 0–149)

## 2017-10-16 PROCEDURE — 80053 COMPREHEN METABOLIC PANEL: CPT

## 2017-10-16 PROCEDURE — 83036 HEMOGLOBIN GLYCOSYLATED A1C: CPT

## 2017-10-16 PROCEDURE — 99396 PREV VISIT EST AGE 40-64: CPT | Performed by: PHYSICIAN ASSISTANT

## 2017-10-16 PROCEDURE — 80061 LIPID PANEL: CPT

## 2017-10-16 PROCEDURE — 36415 COLL VENOUS BLD VENIPUNCTURE: CPT

## 2017-10-16 RX ORDER — MOMETASONE FUROATE 50 UG/1
2 SPRAY, METERED NASAL DAILY
COMMUNITY
End: 2017-10-16 | Stop reason: SDUPTHER

## 2017-10-16 RX ORDER — MOMETASONE FUROATE 50 UG/1
2 SPRAY, METERED NASAL DAILY
Qty: 1 INHALER | Refills: 5 | Status: SHIPPED | OUTPATIENT
Start: 2017-10-16 | End: 2018-01-30

## 2017-10-16 RX ORDER — ALBUTEROL SULFATE 90 UG/1
2 AEROSOL, METERED RESPIRATORY (INHALATION) EVERY 6 HOURS PRN
COMMUNITY
End: 2018-01-30

## 2017-10-16 ASSESSMENT — PATIENT HEALTH QUESTIONNAIRE - PHQ9: CLINICAL INTERPRETATION OF PHQ2 SCORE: 0

## 2017-10-16 NOTE — PROGRESS NOTES
Subjective:   Raheel Kitchen Jr. is a 42 y.o. male here today for annual physical.    Annual physical exam  This is a 42-year-old male who is here today for his annual physical. He states recently that his right ear gets clogged secondary to fluid from his allergies. He has been using Nasonex as needed with good results. Requesting a refill. Depression is controlled. Currently on Lexapro 10 mg daily. He has asthma but is not requesting refill of Ventolin. The right hand third digit finger lesion on the palmar aspect is still bothersome. He states that he believes that it is improving. The lesion appears to split at times and then improved. There is still amount of discomfort. All of this began with a splinter was in the finger.       Current medicines (including changes today)  Current Outpatient Prescriptions   Medication Sig Dispense Refill   • albuterol (VENTOLIN HFA) 108 (90 Base) MCG/ACT Aero Soln inhalation aerosol Inhale 2 Puffs by mouth every 6 hours as needed for Shortness of Breath.     • mometasone (NASONEX) 50 MCG/ACT nasal spray Spray 2 Sprays in nose every day. 1 Inhaler 5   • escitalopram (LEXAPRO) 10 MG Tab TAKE 1+1/2 TABLETS BY MOUTH EVERY DAY. 45 Tab 3   • therapeutic multivitamin-minerals (THERAGRAN-M) Tab Take 1 Tab by mouth every day.       No current facility-administered medications for this visit.      He  has a past medical history of Asthma; Esophageal dilatation; Esophageal ring; and Esophageal stricture. He also has no past medical history of Diabetes (CMS-[facility]).    ROS   No chest pain, no shortness of breath, no abdominal pain and all other systems were reviewed and are negative.       Objective:     Blood pressure 118/80, pulse (!) 54, temperature 36.2 °C (97.1 °F), resp. rate 16, height 1.829 m (6'), weight 81.6 kg (180 lb), SpO2 96 %. Body mass index is 24.41 kg/m².   Physical Exam:  Constitutional: Alert, no distress.  Skin: Warm, dry, good turgor, no rashes in visible areas.Left, third  digit palmar aspect there is a lesion that appears to be dry raised much like a verruca although it has split. No discharge noted.   Eye: Equal, round and reactive, conjunctiva clear, lids normal. Wearing corrective lenses.  ENMT: Lips without lesions, good dentition, oropharynx clear. Right ear with air-fluid level noted.  Neck: Trachea midline, no masses.   Lymph: No cervical or supraclavicular lymphadenopathy  Respiratory: Unlabored respiratory effort, lungs clear to auscultation, no wheezes, no ronchi.  Cardiovascular: Normal S1, S2, mid systolic murmur noted, no edema.  Abdomen: Soft, non-tender, no masses.  Psych: Alert and oriented x3, normal affect and mood.        Assessment and Plan:   The following treatment plan was discussed    1. Annual physical exam  Ordered labs fasting. Advised to follow-up with ophthalmology and dentistry.  - COMP METABOLIC PANEL; Future  - LIPID PANEL  - HEMOGLOBIN A1C; Future    2. Acute seasonal allergic rhinitis due to pollen  Chronic condition with recent exacerbation. Renew Nasonex as directed. He uses as needed. Advised possibly to add an antihistamine such as Claritin. Take it daily.  - mometasone (NASONEX) 50 MCG/ACT nasal spray; Spray 2 Sprays in nose every day.  Dispense: 1 Inhaler; Refill: 5    3. Finger lesion  Today the lesion looks more like a verruca. Advised to use over-the-counter Dr. Leggett's cryotherapy product first. I referred him to dermatology in case the lesion does not improve.  - REFERRAL TO DERMATOLOGY      Followup: Return if symptoms worsen or fail to improve.    Please note that this dictation was created using voice recognition software. I have made every reasonable attempt to correct obvious errors, but I expect that there are errors of grammar and possibly content that I did not discover before finalizing the note.

## 2017-10-16 NOTE — ASSESSMENT & PLAN NOTE
This is a 42-year-old male who is here today for his annual physical. He states recently that his right ear gets clogged secondary to fluid from his allergies. He has been using Nasonex as needed with good results. Requesting a refill. Depression is controlled. Currently on Lexapro 10 mg daily. He has asthma but is not requesting refill of Ventolin. The right hand third digit finger lesion on the palmar aspect is still bothersome. He states that he believes that it is improving. The lesion appears to split at times and then improved. There is still amount of discomfort. All of this began with a splinter was in the finger.

## 2017-10-17 ENCOUNTER — TELEPHONE (OUTPATIENT)
Dept: MEDICAL GROUP | Facility: MEDICAL CENTER | Age: 42
End: 2017-10-17

## 2017-10-17 NOTE — TELEPHONE ENCOUNTER
----- Message from Toan Mcelroy P.A.-C. sent at 10/17/2017  7:35 AM PDT -----  Please contact Raheel.  Labs are good.  Thank you.    Toan

## 2017-10-17 NOTE — TELEPHONE ENCOUNTER
Phone Number Called: [phone] (home)       Message: Patient informed of msg.     Left Message for patient to call back: yes

## 2018-01-02 RX ORDER — ESCITALOPRAM OXALATE 10 MG/1
TABLET ORAL
Qty: 45 TAB | Refills: 1 | Status: SHIPPED | OUTPATIENT
Start: 2018-01-02 | End: 2018-02-25 | Stop reason: SDUPTHER

## 2018-01-30 ENCOUNTER — OFFICE VISIT (OUTPATIENT)
Dept: URGENT CARE | Facility: CLINIC | Age: 43
End: 2018-01-30
Payer: COMMERCIAL

## 2018-01-30 VITALS
RESPIRATION RATE: 20 BRPM | WEIGHT: 202 LBS | HEIGHT: 72 IN | HEART RATE: 88 BPM | OXYGEN SATURATION: 97 % | BODY MASS INDEX: 27.36 KG/M2 | SYSTOLIC BLOOD PRESSURE: 120 MMHG | TEMPERATURE: 97.9 F | DIASTOLIC BLOOD PRESSURE: 78 MMHG

## 2018-01-30 DIAGNOSIS — R68.89 FLU-LIKE SYMPTOMS: Primary | ICD-10-CM

## 2018-01-30 PROCEDURE — 99214 OFFICE O/P EST MOD 30 MIN: CPT | Performed by: FAMILY MEDICINE

## 2018-01-30 RX ORDER — BENZONATATE 100 MG/1
200 CAPSULE ORAL 3 TIMES DAILY PRN
Qty: 30 CAP | Refills: 1 | Status: SHIPPED | OUTPATIENT
Start: 2018-01-30 | End: 2018-10-31

## 2018-01-30 RX ORDER — OSELTAMIVIR PHOSPHATE 75 MG/1
75 CAPSULE ORAL EVERY 12 HOURS
Qty: 10 CAP | Refills: 0 | Status: SHIPPED | OUTPATIENT
Start: 2018-01-30 | End: 2018-02-04

## 2018-01-30 RX ORDER — ALBUTEROL SULFATE 90 UG/1
2 AEROSOL, METERED RESPIRATORY (INHALATION) EVERY 6 HOURS PRN
Qty: 8.5 G | Refills: 3 | Status: SHIPPED | OUTPATIENT
Start: 2018-01-30 | End: 2018-10-31

## 2018-01-30 ASSESSMENT — ENCOUNTER SYMPTOMS
HEMOPTYSIS: 0
FOCAL WEAKNESS: 0
SORE THROAT: 1
MYALGIAS: 1
DIZZINESS: 0
CHILLS: 0
SPUTUM PRODUCTION: 0
HEADACHES: 1
COUGH: 1
FEVER: 1
SHORTNESS OF BREATH: 0

## 2018-01-30 NOTE — PROGRESS NOTES
Subjective:      Raheel Kitchen Jr. is a 42 y.o. male who presents with Cough (COuple days dry cough ,stuffy nose and chest tightness)    Chief Complaint   Patient presents with   • Cough     COuple days dry cough ,stuffy nose and chest tightness        - This is a very pleasant 42 y.o. male with complaints of sudden onset cough coryza aches all over malaise x 2 days.    - Hx asthma, out of inhalers, has been acting up and feeling more tight in chest past week.           ALLERGIES:  Patient has no known allergies.     PMH:  Past Medical History:   Diagnosis Date   • Asthma    • Esophageal dilatation    • Esophageal ring    • Esophageal stricture         MEDS:    Current Outpatient Prescriptions:   •  oseltamivir (TAMIFLU) 75 MG Cap, Take 1 Cap by mouth every 12 hours for 5 days., Disp: 10 Cap, Rfl: 0  •  benzonatate (TESSALON) 100 MG Cap, Take 2 Caps by mouth 3 times a day as needed for Cough., Disp: 30 Cap, Rfl: 1  •  albuterol (PROAIR HFA) 108 (90 Base) MCG/ACT Aero Soln inhalation aerosol, Inhale 2 Puffs by mouth every 6 hours as needed for Shortness of Breath., Disp: 8.5 g, Rfl: 3  •  fluticasone-salmeterol (ADVAIR) 100-50 MCG/DOSE AEROSOL POWDER, BREATH ACTIVATED, Inhale 1 Puff by mouth every 12 hours., Disp: 1 Inhaler, Rfl: 2  •  escitalopram (LEXAPRO) 10 MG Tab, TAKE 1.5 TABLETS BY MOUTH EVERY DAY, Disp: 45 Tab, Rfl: 1  •  therapeutic multivitamin-minerals (THERAGRAN-M) Tab, Take 1 Tab by mouth every day., Disp: , Rfl:     ** I have documented what I find to be significant in regards to past medical, social, family and surgical history  in my HPI or under PMH/PSH/FH review section, otherwise it is contributory **           HPI    Review of Systems   Constitutional: Positive for fever and malaise/fatigue. Negative for chills.   HENT: Positive for congestion and sore throat.    Respiratory: Positive for cough. Negative for hemoptysis, sputum production and shortness of breath.    Musculoskeletal: Positive for  myalgias.   Neurological: Positive for headaches. Negative for dizziness and focal weakness.          Objective:     /78   Pulse 88   Temp 36.6 °C (97.9 °F)   Resp 20   Ht 1.829 m (6')   Wt 91.6 kg (202 lb)   SpO2 97%   BMI 27.40 kg/m²      Physical Exam   Constitutional: He appears well-developed. No distress.   HENT:   Head: Normocephalic and atraumatic.   Mouth/Throat: Oropharynx is clear and moist.   Eyes: Conjunctivae are normal.   Neck: Neck supple.   Cardiovascular: Regular rhythm.    No murmur heard.  Pulmonary/Chest: Effort normal and breath sounds normal. No respiratory distress.   Neurological: He is alert. He exhibits normal muscle tone.   Skin: Skin is warm and dry.   Psychiatric: He has a normal mood and affect. Judgment normal.   Nursing note and vitals reviewed.              Assessment/Plan:         1. Flu-like symptoms  oseltamivir (TAMIFLU) 75 MG Cap    benzonatate (TESSALON) 100 MG Cap    albuterol (PROAIR HFA) 108 (90 Base) MCG/ACT Aero Soln inhalation aerosol    fluticasone-salmeterol (ADVAIR) 100-50 MCG/DOSE AEROSOL POWDER, BREATH ACTIVATED             Dx & d/c instructions discussed w/ patient and/or family members. Follow up w/ Prvt Dr or here in 3-4 days if not getting better, sooner if needed,  ER if worse and UC/PCP unavailable.        Possible side effects (i.e. Rash, GI upset/constipation, sedation, elevation of BP or sugars) of any medications given discussed.

## 2018-02-26 RX ORDER — ESCITALOPRAM OXALATE 10 MG/1
TABLET ORAL
Qty: 45 TAB | Refills: 1 | Status: SHIPPED | OUTPATIENT
Start: 2018-02-26 | End: 2018-06-09 | Stop reason: SDUPTHER

## 2018-06-11 RX ORDER — ESCITALOPRAM OXALATE 10 MG/1
TABLET ORAL
Qty: 45 TAB | Refills: 1 | Status: SHIPPED | OUTPATIENT
Start: 2018-06-11 | End: 2018-08-18 | Stop reason: SDUPTHER

## 2018-10-31 ENCOUNTER — OFFICE VISIT (OUTPATIENT)
Dept: URGENT CARE | Facility: CLINIC | Age: 43
End: 2018-10-31
Payer: COMMERCIAL

## 2018-10-31 VITALS
BODY MASS INDEX: 27.22 KG/M2 | DIASTOLIC BLOOD PRESSURE: 106 MMHG | SYSTOLIC BLOOD PRESSURE: 130 MMHG | HEART RATE: 62 BPM | TEMPERATURE: 97.5 F | OXYGEN SATURATION: 98 % | RESPIRATION RATE: 16 BRPM | HEIGHT: 72 IN | WEIGHT: 201 LBS

## 2018-10-31 DIAGNOSIS — J06.9 UPPER RESPIRATORY TRACT INFECTION, UNSPECIFIED TYPE: ICD-10-CM

## 2018-10-31 DIAGNOSIS — R06.2 WHEEZING: ICD-10-CM

## 2018-10-31 PROCEDURE — 99214 OFFICE O/P EST MOD 30 MIN: CPT | Performed by: PHYSICIAN ASSISTANT

## 2018-10-31 RX ORDER — BENZONATATE 100 MG/1
100-200 CAPSULE ORAL 3 TIMES DAILY PRN
Qty: 60 CAP | Refills: 0 | Status: SHIPPED | OUTPATIENT
Start: 2018-10-31 | End: 2019-03-05

## 2018-10-31 RX ORDER — AZITHROMYCIN 250 MG/1
TABLET, FILM COATED ORAL
Qty: 6 TAB | Refills: 0 | Status: SHIPPED | OUTPATIENT
Start: 2018-10-31 | End: 2019-03-05

## 2018-10-31 RX ORDER — ALBUTEROL SULFATE 90 UG/1
2 AEROSOL, METERED RESPIRATORY (INHALATION) EVERY 6 HOURS PRN
Qty: 8.5 G | Refills: 0 | Status: SHIPPED | OUTPATIENT
Start: 2018-10-31 | End: 2019-03-05

## 2018-10-31 ASSESSMENT — ENCOUNTER SYMPTOMS
NAUSEA: 0
DIARRHEA: 0
FEVER: 0
SINUS PAIN: 0
COUGH: 1
SPUTUM PRODUCTION: 0
CHILLS: 0
ABDOMINAL PAIN: 0
WHEEZING: 1
MUSCULOSKELETAL NEGATIVE: 1
SHORTNESS OF BREATH: 1
VOMITING: 0
MYALGIAS: 0
SORE THROAT: 1
DIZZINESS: 0

## 2018-10-31 ASSESSMENT — COPD QUESTIONNAIRES: COPD: 0

## 2018-10-31 NOTE — PROGRESS NOTES
Subjective:      Raheel Kitchen Jr. is a 43 y.o. male who presents with Cough (Started Sunday, productive, pt. states he has been sick for 12 days, sore throat, congestion, pt. states he cant breath)            Cough   This is a new problem. The current episode started 1 to 4 weeks ago (12 days). The problem has been unchanged. The problem occurs every few minutes. The cough is productive of sputum. Associated symptoms include postnasal drip, a sore throat, shortness of breath and wheezing. Pertinent negatives include no chest pain, chills, ear congestion, ear pain, fever, myalgias, nasal congestion or rash. Nothing aggravates the symptoms. He has tried OTC cough suppressant for the symptoms. The treatment provided mild relief. His past medical history is significant for asthma. There is no history of bronchitis, COPD or pneumonia.     Patient denies fevers, chills, body aches, chest pain, or palpitations. He has a history of asthma and reports mild wheezing and SOB when lying down. No history of pneumonia. No known sick contacts or recent use of antibiotics.     Review of Systems   Constitutional: Negative for chills and fever.   HENT: Positive for congestion, postnasal drip and sore throat. Negative for ear pain and sinus pain.    Respiratory: Positive for cough, shortness of breath and wheezing. Negative for sputum production.    Cardiovascular: Negative for chest pain.   Gastrointestinal: Negative for abdominal pain, diarrhea, nausea and vomiting.   Genitourinary: Negative.    Musculoskeletal: Negative.  Negative for myalgias.   Skin: Negative for rash.   Neurological: Negative for dizziness.          Objective:     /106 (BP Location: Right arm, Patient Position: Sitting, BP Cuff Size: Adult)   Pulse 62   Temp 36.4 °C (97.5 °F) (Temporal)   Resp 16   Ht 1.829 m (6')   Wt 91.2 kg (201 lb)   SpO2 98%   BMI 27.26 kg/m²      Physical Exam   Constitutional: He is oriented to person, place, and time. He  appears well-developed and well-nourished. No distress.   HENT:   Head: Normocephalic and atraumatic.   Right Ear: Hearing, tympanic membrane, external ear and ear canal normal.   Left Ear: Hearing, tympanic membrane, external ear and ear canal normal.   Mouth/Throat: Oropharynx is clear and moist. No oropharyngeal exudate, posterior oropharyngeal edema or posterior oropharyngeal erythema.   Eyes: Pupils are equal, round, and reactive to light. Conjunctivae are normal. Right eye exhibits no discharge. Left eye exhibits no discharge.   Neck: Normal range of motion.   Cardiovascular: Normal rate, regular rhythm and normal heart sounds.    No murmur heard.  Pulmonary/Chest: Effort normal and breath sounds normal. No respiratory distress. He has no wheezes.   Musculoskeletal: Normal range of motion.   Neurological: He is alert and oriented to person, place, and time.   Skin: Skin is warm and dry. He is not diaphoretic.   Psychiatric: He has a normal mood and affect. His behavior is normal.   Nursing note and vitals reviewed.         PMH:  has a past medical history of Asthma; Esophageal dilatation; Esophageal ring; and Esophageal stricture. He also has no past medical history of Diabetes ([facility]).  MEDS:   Current Outpatient Prescriptions:   •  azithromycin (ZITHROMAX) 250 MG Tab, Take 2 tablets on day one, then 1 tablet on days two through five, Disp: 6 Tab, Rfl: 0  •  benzonatate (TESSALON) 100 MG Cap, Take 1-2 Caps by mouth 3 times a day as needed for Cough., Disp: 60 Cap, Rfl: 0  •  albuterol 108 (90 Base) MCG/ACT Aero Soln inhalation aerosol, Inhale 2 Puffs by mouth every 6 hours as needed for Shortness of Breath., Disp: 8.5 g, Rfl: 0  •  escitalopram (LEXAPRO) 10 MG Tab, TAKE 1.5 TABLETS BY MOUTH EVERY DAY, Disp: 45 Tab, Rfl: 1  •  fluticasone-salmeterol (ADVAIR) 100-50 MCG/DOSE AEROSOL POWDER, BREATH ACTIVATED, Inhale 1 Puff by mouth every 12 hours., Disp: 1 Inhaler, Rfl: 2  •  therapeutic multivitamin-minerals  (THERAGRAN-M) Tab, Take 1 Tab by mouth every day., Disp: , Rfl:   ALLERGIES: No Known Allergies  SURGHX:   Past Surgical History:   Procedure Laterality Date   • GASTROSCOPY WITH FOREIGN BODY REMOVAL  10/5/2016    Procedure: GASTROSCOPY WITH FOREIGN BODY REMOVAL;  Surgeon: William Mayen M.D.;  Location: SURGERY [facility];  Service:    • APPENDECTOMY       SOCHX:  reports that he has never smoked. He has never used smokeless tobacco. He reports that he drinks alcohol. He reports that he does not use drugs.  FH: family history includes Cancer in his mother; Heart Disease in his father.       Assessment/Plan:     1. Upper respiratory tract infection, unspecified type  - azithromycin (ZITHROMAX) 250 MG Tab; Take 2 tablets on day one, then 1 tablet on days two through five  Dispense: 6 Tab; Refill: 0   - Complete full course of antibiotics as prescribed   - benzonatate (TESSALON) 100 MG Cap; Take 1-2 Caps by mouth 3 times a day as needed for Cough.  Dispense: 60 Cap; Refill: 0    2. Wheezing  - albuterol 108 (90 Base) MCG/ACT Aero Soln inhalation aerosol; Inhale 2 Puffs by mouth every 6 hours as needed for Shortness of Breath.  Dispense: 8.5 g; Refill: 0    Call or return to office if symptoms persist or worsen, would recommend CXR at that time.  The patient demonstrated a good understanding and agreed with the treatment plan.

## 2019-03-05 ENCOUNTER — OFFICE VISIT (OUTPATIENT)
Dept: URGENT CARE | Facility: CLINIC | Age: 44
End: 2019-03-05
Payer: COMMERCIAL

## 2019-03-05 VITALS
SYSTOLIC BLOOD PRESSURE: 120 MMHG | BODY MASS INDEX: 27.63 KG/M2 | RESPIRATION RATE: 20 BRPM | TEMPERATURE: 97.4 F | HEART RATE: 68 BPM | DIASTOLIC BLOOD PRESSURE: 72 MMHG | WEIGHT: 204 LBS | OXYGEN SATURATION: 97 % | HEIGHT: 72 IN

## 2019-03-05 DIAGNOSIS — R68.89 FLU-LIKE SYMPTOMS: Primary | ICD-10-CM

## 2019-03-05 DIAGNOSIS — R09.81 SINUS CONGESTION: ICD-10-CM

## 2019-03-05 PROCEDURE — 99214 OFFICE O/P EST MOD 30 MIN: CPT | Performed by: PHYSICIAN ASSISTANT

## 2019-03-05 RX ORDER — OSELTAMIVIR PHOSPHATE 75 MG/1
75 CAPSULE ORAL 2 TIMES DAILY
Qty: 10 CAP | Refills: 0 | Status: SHIPPED | OUTPATIENT
Start: 2019-03-05 | End: 2019-03-10

## 2019-03-05 NOTE — PROGRESS NOTES
Subjective:      Pt is a 43 y.o. male who presents with Sinus Problem (Over 2 wks fever comes and goes, ears plugged .)            HPI  This is a new problem. PT presents to  clinic today complaining of sore throat, fevers, chills, body aches, watery eyes, pressure in ears, cough, fatigue, runny nose. PT denies CP, SOB, NVD, abdominal pain, joint pain. PT states these symptoms began around 2 weeks ago. PT states the pain is a 7/10, aching in nature and worse at night.  Pt has not taken any RX medications for this condition. The pt's medication list, problem list, and allergies have been evaluated and reviewed during today's visit.      PMH:  Past Medical History:   Diagnosis Date   • Asthma    • Esophageal dilatation    • Esophageal ring    • Esophageal stricture        PSH:  Past Surgical History:   Procedure Laterality Date   • GASTROSCOPY WITH FOREIGN BODY REMOVAL  10/5/2016    Procedure: GASTROSCOPY WITH FOREIGN BODY REMOVAL;  Surgeon: William Mayen M.D.;  Location: SURGERY [facility];  Service:    • APPENDECTOMY         Fam Hx:    family history includes Cancer in his mother; Heart Disease in his father.  Family Status   Relation Status   • Mo    • Fa Alive       Soc HX:  Social History     Social History   • Marital status:      Spouse name: N/A   • Number of children: N/A   • Years of education: N/A     Occupational History   • Not on file.     Social History Main Topics   • Smoking status: Never Smoker   • Smokeless tobacco: Never Used   • Alcohol use 0.0 oz/week      Comment: rare    • Drug use: No   • Sexual activity: Yes     Partners: Female     Other Topics Concern   • Not on file     Social History Narrative   • No narrative on file         Medications:    Current Outpatient Prescriptions:   •  oseltamivir (TAMIFLU) 75 MG Cap, Take 1 Cap by mouth 2 times a day for 5 days., Disp: 10 Cap, Rfl: 0  •  escitalopram (LEXAPRO) 10 MG Tab, TAKE 1.5 TABLETS BY MOUTH EVERY DAY,  Disp: 45 Tab, Rfl: 0  •  therapeutic multivitamin-minerals (THERAGRAN-M) Tab, Take 1 Tab by mouth every day., Disp: , Rfl:   •  fluticasone-salmeterol (ADVAIR) 100-50 MCG/DOSE AEROSOL POWDER, BREATH ACTIVATED, Inhale 1 Puff by mouth every 12 hours., Disp: 1 Inhaler, Rfl: 2      Allergies:  Patient has no known allergies.    ROS  Constitutional: Positive for chills, fevers, body aches and malaise/fatigue.   HENT: Positive for congestion and sore throat. Negative for ear pain.    Eyes: Negative for blurred vision, double vision and photophobia.   Respiratory: Positive for cough and sputum production. Negative for hemoptysis, shortness of breath and wheezing.    Cardiovascular: Negative for chest pain and palpitations.   Gastrointestinal: Negative for nausea, vomiting, abdominal pain, diarrhea and constipation.   Genitourinary: Negative for dysuria and flank pain.   Musculoskeletal: Negative for falls and myalgias.   Skin: Negative for itching and rash.   Neurological: Positive for headaches. Negative for dizziness and tingling.   Endo/Heme/Allergies: Does not bruise/bleed easily.   Psychiatric/Behavioral: Negative for depression. The patient is not nervous/anxious.             Objective:     /72 (BP Location: Right arm, Patient Position: Sitting, BP Cuff Size: Adult)   Pulse 68   Temp 36.3 °C (97.4 °F) (Temporal)   Resp 20   Ht 1.829 m (6')   Wt 92.5 kg (204 lb)   SpO2 97%   BMI 27.67 kg/m²      Physical Exam       Constitutional: PT is oriented to person, place, and time. PT appears well-developed and well-nourished. No distress.   HENT:   Head: Normocephalic and atraumatic.   Right Ear: Hearing, tympanic membrane, external ear and ear canal normal.   Left Ear: Hearing, tympanic membrane, external ear and ear canal normal.   Nose: Mucosal edema, rhinorrhea and sinus tenderness present. Right sinus exhibits frontal sinus tenderness. Left sinus exhibits frontal sinus tenderness.   Mouth/Throat: Uvula is  midline. Mucous membranes are pale. Posterior oropharyngeal edema and posterior oropharyngeal erythema with exudate noted on exam.   Eyes: Conjunctivae normal and EOM are normal. Pupils are equal, round, and reactive to light.   Neck: Normal range of motion. Neck supple. No thyromegaly present.   Cardiovascular: Normal rate, regular rhythm, normal heart sounds and intact distal pulses.  Exam reveals no gallop and no friction rub.    No murmur heard.  Pulmonary/Chest: Effort normal and breath sounds normal. No respiratory distress. PT has no wheezes. PT has no rales. PT exhibits no tenderness.   Abdominal: Soft. Bowel sounds are normal. PT exhibits no distension and no mass. There is no tenderness. There is no rebound and no guarding.   Musculoskeletal: Normal range of motion. PT exhibits no edema and no tenderness.   Lymphadenopathy:     PT has no cervical adenopathy.   Neurological: PT is alert and oriented to person, place, and time. PT displays normal reflexes. No cranial nerve deficit. PT exhibits normal muscle tone. Coordination normal.   Skin: Skin is warm and dry. No rash noted. No erythema.   Psychiatric: PT has a normal mood and affect. PT behavior is normal. Judgment and thought content normal.        Assessment/Plan:     1. Flu-like symptoms  Contingent plan per pt request:  - oseltamivir (TAMIFLU) 75 MG Cap; Take 1 Cap by mouth 2 times a day for 5 days.  Dispense: 10 Cap; Refill: 0    2. Sinus congestion    Rest, fluids encouraged.  OTC decongestant for congestion/cough  Note given for work.  AVS with medical info given.  Pt was in full understanding and agreement with the plan.  Differential diagnosis, natural history, supportive care, and indications for immediate follow-up discussed. All questions answered. Patient agrees with the plan of care.  Follow-up as needed if symptoms worsen or fail to improve.

## 2019-04-17 RX ORDER — ESCITALOPRAM OXALATE 10 MG/1
TABLET ORAL
Qty: 45 TAB | OUTPATIENT
Start: 2019-04-17

## 2019-06-18 ENCOUNTER — OFFICE VISIT (OUTPATIENT)
Dept: URGENT CARE | Facility: CLINIC | Age: 44
End: 2019-06-18
Payer: COMMERCIAL

## 2019-06-18 VITALS
TEMPERATURE: 98.9 F | HEART RATE: 59 BPM | SYSTOLIC BLOOD PRESSURE: 130 MMHG | BODY MASS INDEX: 27.63 KG/M2 | RESPIRATION RATE: 16 BRPM | WEIGHT: 204 LBS | DIASTOLIC BLOOD PRESSURE: 90 MMHG | HEIGHT: 72 IN | OXYGEN SATURATION: 96 %

## 2019-06-18 DIAGNOSIS — K52.9 GASTROENTERITIS: ICD-10-CM

## 2019-06-18 DIAGNOSIS — H92.01 RIGHT EAR PAIN: ICD-10-CM

## 2019-06-18 PROCEDURE — 99214 OFFICE O/P EST MOD 30 MIN: CPT | Performed by: NURSE PRACTITIONER

## 2019-06-18 RX ORDER — ACETAMINOPHEN 325 MG/1
650 TABLET ORAL EVERY 4 HOURS PRN
Qty: 30 TAB | Refills: 0 | Status: SHIPPED | OUTPATIENT
Start: 2019-06-18 | End: 2020-04-14

## 2019-06-18 RX ORDER — AMOXICILLIN 500 MG/1
1000 CAPSULE ORAL 3 TIMES DAILY
Qty: 60 CAP | Refills: 0 | Status: SHIPPED | OUTPATIENT
Start: 2019-06-18 | End: 2019-06-28

## 2019-06-18 ASSESSMENT — ENCOUNTER SYMPTOMS
SORE THROAT: 1
NECK PAIN: 0
COUGH: 0
VOMITING: 0
EYE PAIN: 0
SINUS PAIN: 0
CHILLS: 0
DIARRHEA: 0
PSYCHIATRIC NEGATIVE: 1
EYE DISCHARGE: 0
BLOOD IN STOOL: 0
RHINORRHEA: 0
EYE REDNESS: 0
CONSTIPATION: 0
ABDOMINAL PAIN: 1
CARDIOVASCULAR NEGATIVE: 1
NEUROLOGICAL NEGATIVE: 1
FEVER: 0

## 2019-06-18 NOTE — PROGRESS NOTES
Subjective:     Raheel Kitchen Jr. is a 44 y.o. male who presents for Otalgia ((R)feels like its draining x 4 days, dull stomach aches, )      Sudden urge to have BM, more frequently than normal on Saturday and Sunday. Changes in BM resolved. Has continued generalized abdominal pain. Nephew also had GI symptoms.  Abdominal pain 2/10. No fever, nausea, vomiting. No diarrhea or blood in stool.     History of seasonal allergies, not locally. Right ear pain 5/10; heavy dull pain. History of ear infection 4-5 months ago.       Otalgia    There is pain in the right ear. This is a new problem. Episode onset: Friday. There has been no fever. Associated symptoms include abdominal pain, ear discharge and a sore throat. Pertinent negatives include no coughing, diarrhea, hearing loss, neck pain, rash, rhinorrhea or vomiting. Associated symptoms comments: Pressure on right ear, felt like fluid was coming out of ear. No color to it. History of swimming in a pool.. He has tried nothing for the symptoms.       Past Medical History:   Diagnosis Date   • Asthma    • Esophageal dilatation    • Esophageal ring    • Esophageal stricture        Past Surgical History:   Procedure Laterality Date   • GASTROSCOPY WITH FOREIGN BODY REMOVAL  10/5/2016    Procedure: GASTROSCOPY WITH FOREIGN BODY REMOVAL;  Surgeon: William Mayen M.D.;  Location: SURGERY [facility];  Service:    • APPENDECTOMY         Social History     Social History   • Marital status:      Spouse name: N/A   • Number of children: N/A   • Years of education: N/A     Occupational History   • Not on file.     Social History Main Topics   • Smoking status: Never Smoker   • Smokeless tobacco: Never Used   • Alcohol use 0.0 oz/week      Comment: rare    • Drug use: No   • Sexual activity: Yes     Partners: Female     Other Topics Concern   • Not on file     Social History Narrative   • No narrative on file        Family History   Problem Relation Age of Onset    • Cancer Mother         pancreatic cancer   • Heart Disease Father         No Known Allergies    Review of Systems   Constitutional: Negative for chills and fever.   HENT: Positive for ear discharge, ear pain and sore throat. Negative for congestion, hearing loss, nosebleeds, rhinorrhea and sinus pain.    Eyes: Negative for pain, discharge and redness.   Respiratory: Negative for cough.    Cardiovascular: Negative.    Gastrointestinal: Positive for abdominal pain. Negative for blood in stool, constipation, diarrhea and vomiting.        Has has bilateral generalized abdominal pain with soft stools, which seems to be resolving.    Genitourinary: Negative.  Negative for dysuria, frequency, hematuria and urgency.   Musculoskeletal: Negative for neck pain.   Skin: Negative for rash.   Neurological: Negative.    Endo/Heme/Allergies: Positive for environmental allergies.   Psychiatric/Behavioral: Negative.         Objective:   /90   Pulse (!) 59   Temp 37.2 °C (98.9 °F)   Resp 16   Ht 1.829 m (6')   Wt 92.5 kg (204 lb)   SpO2 96%   BMI 27.67 kg/m²     Physical Exam   Constitutional: He is oriented to person, place, and time. Vital signs are normal. He appears well-developed and well-nourished. No distress.   HENT:   Head: Normocephalic and atraumatic.   Right Ear: External ear and ear canal normal. No drainage, swelling or tenderness. No mastoid tenderness. Tympanic membrane is not injected, not perforated, not erythematous and not bulging. A middle ear effusion is present. No hemotympanum.   Left Ear: Tympanic membrane, external ear and ear canal normal.   Nose: Nose normal.   Mouth/Throat: Uvula is midline, oropharynx is clear and moist and mucous membranes are normal.   Eyes: Conjunctivae are normal.   Neck: Normal range of motion. Neck supple.   Cardiovascular: Normal heart sounds.    Pulmonary/Chest: Effort normal and breath sounds normal. No respiratory distress. He has no wheezes. He has no rales.    Abdominal: Soft. Bowel sounds are normal. He exhibits no distension, no ascites and no mass. There is no tenderness. There is no rigidity, no rebound, no guarding, no tenderness at McBurney's point and negative Mondragon's sign.   Musculoskeletal: Normal range of motion.   Neurological: He is alert and oriented to person, place, and time.   Skin: Skin is warm and dry. Capillary refill takes less than 2 seconds.   Psychiatric: He has a normal mood and affect. His behavior is normal. Judgment and thought content normal.   Vitals reviewed.      Assessment/Plan:   1. Right ear pain  - acetaminophen (TYLENOL) 325 MG Tab; Take 2 Tabs by mouth every four hours as needed.  Dispense: 30 Tab; Refill: 0  -Take OTC oral decongestant as directed.   Contingent- amoxicillin (AMOXIL) 500 MG Cap; Take 2 Caps by mouth 3 times a day for 10 days.  Dispense: 60 Cap; Refill: 0    2. Gastroenteritis  -Oral fluids.  -Follow up urgently for increased abdominal pain, vomiting, fevers, or any other concerns.     Differential diagnosis, natural history, supportive care, and indications for immediate follow-up discussed.

## 2019-06-18 NOTE — PATIENT INSTRUCTIONS
Otalgia  Otalgia is pain in or around the ear. When the pain is from the ear itself it is called primary otalgia. Pain may also be coming from somewhere else, like the head and neck. This is called secondary otalgia.   CAUSES   Causes of primary otalgia include:  · Middle ear infection.  · It can also be caused by injury to the ear or infection of the ear canal (swimmer's ear). Swimmer's ear causes pain, swelling and often drainage from the ear canal.  Causes of secondary otalgia include:  · Sinus infections.  · Allergies and colds that cause stuffiness of the nose and tubes that drain the ears (eustachian tubes).  · Dental problems like cavities, gum infections or teething.  · Sore Throat (tonsillitis and pharyngitis).  · Swollen glands in the neck.  · Infection of the bone behind the ear (mastoiditis).  · TMJ discomfort (problems with the joint between your jaw and your skull).  · Other problems such as nerve disorders, circulation problems, heart disease and tumors of the head and neck can also cause symptoms of ear pain. This is rare.  DIAGNOSIS   Evaluation, Diagnosis and Testing:  · Examination by your medical caregiver is recommended to evaluate and diagnose the cause of otalgia.  · Further testing or referral to a specialist may be indicated if the cause of the ear pain is not found and the symptom persists.  TREATMENT   · Your doctor may prescribe antibiotics if an ear infection is diagnosed.  · Pain relievers and topical analgesics may be recommended.  · It is important to take all medications as prescribed.  HOME CARE INSTRUCTIONS   · It may be helpful to sleep with the painful ear in the up position.  · A warm compress over the painful ear may provide relief.  · A soft diet and avoiding gum may help while ear pain is present.  SEEK IMMEDIATE MEDICAL CARE IF:  · You develop severe pain, a high fever, repeated vomiting or dehydration.  · You develop extreme dizziness, headache, confusion, ringing in the  ears (tinnitus) or hearing loss.  Document Released: 01/25/2006 Document Revised: 03/11/2013 Document Reviewed: 10/27/2010  [employer]® Patient Information ©2014 [employer].      Abdominal Pain, Adult  Abdominal pain can be caused by many things. Often, abdominal pain is not serious and it gets better with no treatment or by being treated at home. However, sometimes abdominal pain is serious. Your health care provider will do a medical history and a physical exam to try to determine the cause of your abdominal pain.  Follow these instructions at home:  · Take over-the-counter and prescription medicines only as told by your health care provider. Do not take a laxative unless told by your health care provider.  · Drink enough fluid to keep your urine clear or pale yellow.  · Watch your condition for any changes.  · Keep all follow-up visits as told by your health care provider. This is important.  Contact a health care provider if:  · Your abdominal pain changes or gets worse.  · You are not hungry or you lose weight without trying.  · You are constipated or have diarrhea for more than 2-3 days.  · You have pain when you urinate or have a bowel movement.  · Your abdominal pain wakes you up at night.  · Your pain gets worse with meals, after eating, or with certain foods.  · You are throwing up and cannot keep anything down.  · You have a fever.  Get help right away if:  · Your pain does not go away as soon as your health care provider told you to expect.  · You cannot stop throwing up.  · Your pain is only in areas of the abdomen, such as the right side or the left lower portion of the abdomen.  · You have bloody or black stools, or stools that look like tar.  · You have severe pain, cramping, or bloating in your abdomen.  · You have signs of dehydration, such as:  ¨ Dark urine, very little urine, or no urine.  ¨ Cracked lips.  ¨ Dry mouth.  ¨ Sunken eyes.  ¨ Sleepiness.  ¨ Weakness.  This information is not intended  to replace advice given to you by your health care provider. Make sure you discuss any questions you have with your health care provider.  Document Released: 09/27/2006 Document Revised: 07/07/2017 Document Reviewed: 05/31/2017  [employer] Interactive Patient Education © 2017 [employer]

## 2019-12-26 ENCOUNTER — OFFICE VISIT (OUTPATIENT)
Dept: URGENT CARE | Facility: CLINIC | Age: 44
End: 2019-12-26
Payer: COMMERCIAL

## 2019-12-26 ENCOUNTER — APPOINTMENT (OUTPATIENT)
Dept: RADIOLOGY | Facility: IMAGING CENTER | Age: 44
End: 2019-12-26
Attending: PHYSICIAN ASSISTANT
Payer: COMMERCIAL

## 2019-12-26 VITALS
BODY MASS INDEX: 26.85 KG/M2 | DIASTOLIC BLOOD PRESSURE: 85 MMHG | TEMPERATURE: 98 F | HEART RATE: 62 BPM | OXYGEN SATURATION: 95 % | WEIGHT: 198 LBS | SYSTOLIC BLOOD PRESSURE: 130 MMHG | RESPIRATION RATE: 20 BRPM

## 2019-12-26 DIAGNOSIS — R19.5 LOOSE STOOLS: ICD-10-CM

## 2019-12-26 DIAGNOSIS — R11.14 BILIOUS VOMITING WITH NAUSEA: ICD-10-CM

## 2019-12-26 DIAGNOSIS — R10.11 RUQ ABDOMINAL PAIN: ICD-10-CM

## 2019-12-26 DIAGNOSIS — R10.11 RUQ ABDOMINAL PAIN: Primary | ICD-10-CM

## 2019-12-26 PROCEDURE — 74018 RADEX ABDOMEN 1 VIEW: CPT | Mod: TC | Performed by: PHYSICIAN ASSISTANT

## 2019-12-26 PROCEDURE — 99214 OFFICE O/P EST MOD 30 MIN: CPT | Performed by: PHYSICIAN ASSISTANT

## 2019-12-26 RX ORDER — PROMETHAZINE HYDROCHLORIDE 25 MG/1
25 TABLET ORAL EVERY 6 HOURS PRN
Qty: 30 TAB | Refills: 0 | Status: SHIPPED | OUTPATIENT
Start: 2019-12-26 | End: 2020-04-14

## 2019-12-26 RX ORDER — DICYCLOMINE HCL 20 MG
20 TABLET ORAL EVERY 6 HOURS
Qty: 20 TAB | Refills: 0 | Status: SHIPPED | OUTPATIENT
Start: 2019-12-26 | End: 2019-12-31

## 2019-12-26 NOTE — PROGRESS NOTES
Subjective:      Pt is a 44 y.o. male who presents with GI Problem            HPI  This is a new problem. The current episode started in the past 1 day and had another episode 3 weeks ago and once in Sept. The problem occurs 2 to 4 times per day. The problem has been gradually worsening. The stool consistency is described as loose. The patient states that stools awakens them from sleep. Nothing aggravates the symptoms. Risk factors include suspect food intake. Pt notes sulphuric burping and abd cramping. They have tried anti-motility drug for the symptoms. The treatment provided mild relief. There is no history of bowel resection, inflammatory bowel disease, irritable bowel syndrome, malabsorption, a recent abdominal surgery or short gut syndrome.   Pt states for the last 1 day, they have had abd cramping, loose stools, burping, with nausea and vomiting. Pt denies blood or mucus in the stool. Pt suspects contaminated food as etiology. Pt states OTC Pepto is mildly helping.  Pt has not taken any Rx medications for this condition. PT states the pain is a 6/10, aching in nature and worse at night. Pt denies CP, SOB,  paresthesias, headaches, dizziness, change in vision, hives, or joint pain. The pt's medication list, problem list, and allergies have been evaluated and reviewed during today's visit.     PMH:  Past Medical History:   Diagnosis Date   • Asthma    • Esophageal dilatation    • Esophageal ring    • Esophageal stricture        PSH:  Past Surgical History:   Procedure Laterality Date   • GASTROSCOPY WITH FOREIGN BODY REMOVAL  10/5/2016    Procedure: GASTROSCOPY WITH FOREIGN BODY REMOVAL;  Surgeon: William Mayen M.D.;  Location: SURGERY [facility];  Service:    • APPENDECTOMY         Fam Hx:    family history includes Cancer in his mother; Heart Disease in his father.  Family Status   Relation Name Status   • Mo     • Fa  Alive       Soc HX:  Social History     Socioeconomic History   •  Marital status:      Spouse name: Not on file   • Number of children: Not on file   • Years of education: Not on file   • Highest education level: Not on file   Occupational History   • Not on file   Social Needs   • Financial resource strain: Not on file   • Food insecurity:     Worry: Not on file     Inability: Not on file   • Transportation needs:     Medical: Not on file     Non-medical: Not on file   Tobacco Use   • Smoking status: Never Smoker   • Smokeless tobacco: Never Used   Substance and Sexual Activity   • Alcohol use: Yes     Alcohol/week: 0.0 oz     Comment: rare    • Drug use: No   • Sexual activity: Yes     Partners: Female   Lifestyle   • Physical activity:     Days per week: Not on file     Minutes per session: Not on file   • Stress: Not on file   Relationships   • Social connections:     Talks on phone: Not on file     Gets together: Not on file     Attends [employer] service: Not on file     Active member of club or organization: Not on file     Attends meetings of clubs or organizations: Not on file     Relationship status: Not on file   • Intimate partner violence:     Fear of current or ex partner: Not on file     Emotionally abused: Not on file     Physically abused: Not on file     Forced sexual activity: Not on file   Other Topics Concern   • Not on file   Social History Narrative   • Not on file         Medications:    Current Outpatient Medications:   •  acetaminophen (TYLENOL) 325 MG Tab, Take 2 Tabs by mouth every four hours as needed., Disp: 30 Tab, Rfl: 0  •  escitalopram (LEXAPRO) 10 MG Tab, TAKE 1+1/2 TABLETS BY MOUTH EVERY DAY *NEED APPT FOR REFILLS*, Disp: 45 Tab, Rfl: 0  •  fluticasone-salmeterol (ADVAIR) 100-50 MCG/DOSE AEROSOL POWDER, BREATH ACTIVATED, Inhale 1 Puff by mouth every 12 hours., Disp: 1 Inhaler, Rfl: 2  •  therapeutic multivitamin-minerals (THERAGRAN-M) Tab, Take 1 Tab by mouth every day., Disp: , Rfl:       Allergies:  Patient has no known  allergies.    ROS  Constitutional: Negative for fever, chills and malaise/fatigue.   HENT: Negative for congestion and sore throat.    Eyes: Negative for blurred vision, double vision and photophobia.   Respiratory: Negative for cough and shortness of breath.  Cardiovascular: Negative for chest pain and palpitations.   Gastrointestinal: POS nausea, vomiting, abdominal pain, diarrhea   Genitourinary: Negative for dysuria and flank pain.   Musculoskeletal: Negative for joint pain and myalgias.   Skin: Negative for itching and rash.   Neurological: Negative for dizziness, tingling and headaches.   Endo/Heme/Allergies: Does not bruise/bleed easily.   Psychiatric/Behavioral: Negative for depression. The patient is not nervous/anxious.           Objective:     /85   Pulse 62   Temp 36.7 °C (98 °F) (Temporal)   Resp 20   Wt 89.8 kg (198 lb)   SpO2 95%   BMI 26.85 kg/m²      Physical Exam  Abdominal:      General: Abdomen is flat. Bowel sounds are normal. There is no distension or abdominal bruit. There are no signs of injury.      Palpations: Abdomen is rigid.      Tenderness: There is tenderness in the right upper quadrant. There is no right CVA tenderness, left CVA tenderness, guarding or rebound. Negative signs include Mondragon's sign, Rovsing's sign, McBurney's sign, psoas sign and obturator sign.      Hernia: No hernia is present.                Constitutional: PT is oriented to person, place, and time. PT appears well-developed and well-nourished. No distress.   HENT:   Head: Normocephalic and atraumatic.   Mouth/Throat: Oropharynx is clear and moist. No oropharyngeal exudate.   Eyes: Conjunctivae normal and EOM are normal. Pupils are equal, round, and reactive to light.   Neck: Normal range of motion. Neck supple. No thyromegaly present.   Cardiovascular: Normal rate, regular rhythm, normal heart sounds and intact distal pulses.  Exam reveals no gallop and no friction rub.    No murmur  heard.  Pulmonary/Chest: Effort normal and breath sounds normal. No respiratory distress. PT has no wheezes. PT has no rales. Pt exhibits no tenderness.   Musculoskeletal: Normal range of motion. PT exhibits no edema and no tenderness.   Neurological: PT is alert and oriented to person, place, and time. PT has normal reflexes. No cranial nerve deficit.   Skin: Skin is warm and dry. No rash noted. PT is not diaphoretic. No erythema.       Psychiatric: PT has a normal mood and affect. PT behavior is normal. Judgment and thought content normal.      RADS:  [id number] VIEW   Order: [id number]   Status:  Final result   Visible to patient:  No (Not Released)   Next appt:  None   Dx:  RUQ abdominal pain; Loose stools; Jimbo...   Details     Reading Physician Reading Date Result Priority   Álvaro Max M.D. 12/26/2019 Urgent Care      Narrative & Impression        12/26/2019 11:11 AM     HISTORY/REASON FOR EXAM:  Abdominal Pain.     TECHNIQUE/EXAM DESCRIPTION AND NUMBER OF VIEWS:  1 view(s) of the abdomen.     COMPARISON: None.     FINDINGS:  Nonobstructive bowel gas pattern. Small amount of stool throughout the colon.  No abnormal calcifications projecting over the field of view.  No acute fracture.     IMPRESSION:     Nonobstructive bowel gas pattern. Small amount of stool throughout the colon.            Last Resulted: 12/26/19 11:26 AM                 Assessment/Plan:       1. RUQ abdominal pain    - [id number] VIEW; Future    2. Bilious vomiting with nausea    - [id number] VIEW; Future    3. Loose stools    - [id number] VIEW; Future    Bentyl  Phenergan    Pt with [facility] and therefore no CTs can be ordered and pt does not wish to go to the ER  Rest, fluids encouraged.  [place] diet encouraged  STRICT ER precautions  AVS with medical info given.  Pt was in full understanding and agreement with the plan.  Differential diagnosis, natural history, supportive care, and indications for immediate follow-up  discussed. All questions answered. Patient agrees with the plan of care.  Follow-up as needed if symptoms worsen or fail to improve to PCP, Urgent care or Emergency Room.

## 2019-12-26 NOTE — PATIENT INSTRUCTIONS
Food Poisoning  Food poisoning is an illness that is caused by eating or drinking contaminated foods or drinks. In most cases, food poisoning is mild and lasts 1-2 days. However, some cases can be serious, especially for people who have weak body defense (immune) systems, older people, children and infants, and pregnant women.  What are the causes?  Foods can become contaminated with viruses, bacteria, parasites, mold, or chemicals as a result of:  · Poor personal hygiene, such as poor hand washing practices.  · Storing food improperly, such as not refrigerating raw meat.  · Using unclean surfaces for serving, preparing, and storing food.  · Cooking or eating with unclean utensils.  If contaminated food is eaten, viruses, bacteria, or parasites can harm the intestine. This often causes severe diarrhea. The most common causes of food poisoning include:  · Viruses, such as:  ¨ Norovirus.  ¨ Rotavirus.  · Bacteria, such as:  ¨ Salmonella.  ¨ Listeria.  ¨ E. coli (Escherichia coli).  · Parasites, such as:  ¨ Giardia.  ¨ Toxoplasmosis.  What are the signs or symptoms?  Symptoms may take several hours to appear after you consume contaminated food or drink. Symptoms include:  · Nausea.  · Vomiting.  · Cramping.  · Diarrhea.  · Fever and chills.  · Muscle aches.  · Dehydration. Dehydration can cause you to be tired and thirsty, have a dry mouth, and urinate less frequently.  How is this diagnosed?  Your health care provider can diagnose food poisoning with a medical history and physical exam. This will include asking you what you have recently eaten. You may also have tests, including:  · Blood tests.  · Stool tests.  How is this treated?  Treatment focuses on relieving your symptoms and making sure that you are hydrated. You may also be given medicines. In severe cases, hospitalization may be required and you may need to receive fluids through an IV tube.  Follow these instructions at home:  Eating and drinking  · Drink  enough fluids to keep your urine clear or pale yellow. You may need to drink small amounts of clear liquids frequently.  · Avoid milk, caffeine, and alcohol.  · Ask your health care provider for specific rehydration instructions.  · Eat small, frequent meals rather than large meals.  Medicines  · Take over-the-counter and prescription medicines only as told by your health care provider. Ask your health care provider if you should continue to take any of your regular prescribed and over-the-counter medicines.  · If you were prescribed an antibiotic medicine, take it as told by your health care provider. Do not stop taking the antibiotic even if you start to feel better.  General instructions  · Wash your hands thoroughly before you prepare food and after you go to the bathroom (use the toilet). Make sure people who live with you also wash their hands often.  · Clean surfaces that you touch with a product that contains chlorine bleach.  · Keep all follow-up visits as told by your health care provider. This is important.  How is this prevented?  · Wash your hands, food preparation surfaces, and utensils thoroughly before and after you handle raw foods.  · Use separate food preparation surfaces and storage spaces for raw meat and for fruits and vegetables.  · Keep refrigerated foods colder than 40°F (5°C).  · Serve hot foods immediately or keep them heated above 140°F (60°C).  · Store dry foods in cool, dry spaces away from excess heat or moisture. Throw out any foods that do not smell right or are in cans that are bulging.  · Follow approved pedro procedures.  · Heat canned foods thoroughly before you taste them.  · Drink bottled or sterile water when you travel.  Get help right away if:  Call 911 or go to the emergency room if:  · You have difficulty breathing, swallowing, talking, or moving.  · You develop blurred vision.  · You cannot eat or drink without vomiting.  · You faint.  · Your eyes turn yellow.  · Your  vomiting or diarrhea is persistent.  · Abdominal pain develops, increases, or localizes in one small area.  · You have a fever.  · You have blood or mucus in your stools, or your stools look dark black and tarry.  · You have signs of dehydration, such as:  ¨ Dark urine, very little urine, or no urine.  ¨ Cracked lips.  ¨ Not making tears while crying.  ¨ Dry mouth.  ¨ Sunken eyes.  ¨ Sleepiness.  ¨ Weakness.  ¨ Dizziness.  This information is not intended to replace advice given to you by your health care provider. Make sure you discuss any questions you have with your health care provider.  Document Released: 09/15/2005 Document Revised: 05/16/2017 Document Reviewed: 06/20/2016  [employer] Interactive Patient Education © 2017 [employer]

## 2020-01-29 ENCOUNTER — HOSPITAL ENCOUNTER (OUTPATIENT)
Dept: LAB | Facility: MEDICAL CENTER | Age: 45
End: 2020-01-29
Attending: INTERNAL MEDICINE
Payer: COMMERCIAL

## 2020-01-29 LAB
ALBUMIN SERPL BCP-MCNC: 4.6 G/DL (ref 3.2–4.9)
ALP SERPL-CCNC: 47 U/L (ref 30–99)
ALT SERPL-CCNC: 23 U/L (ref 2–50)
AST SERPL-CCNC: 25 U/L (ref 12–45)
BASOPHILS # BLD AUTO: 0.5 % (ref 0–1.8)
BASOPHILS # BLD: 0.04 K/UL (ref 0–0.12)
BILIRUB CONJ SERPL-MCNC: 0.1 MG/DL (ref 0.1–0.5)
BILIRUB INDIRECT SERPL-MCNC: 0.9 MG/DL (ref 0–1)
BILIRUB SERPL-MCNC: 1 MG/DL (ref 0.1–1.5)
EOSINOPHIL # BLD AUTO: 0.38 K/UL (ref 0–0.51)
EOSINOPHIL NFR BLD: 5 % (ref 0–6.9)
ERYTHROCYTE [DISTWIDTH] IN BLOOD BY AUTOMATED COUNT: 38.4 FL (ref 35.9–50)
HCT VFR BLD AUTO: 45 % (ref 42–52)
HGB BLD-MCNC: 15.9 G/DL (ref 14–18)
IMM GRANULOCYTES # BLD AUTO: 0.03 K/UL (ref 0–0.11)
IMM GRANULOCYTES NFR BLD AUTO: 0.4 % (ref 0–0.9)
LIPASE SERPL-CCNC: 25 U/L (ref 11–82)
LYMPHOCYTES # BLD AUTO: 2.26 K/UL (ref 1–4.8)
LYMPHOCYTES NFR BLD: 29.6 % (ref 22–41)
MCH RBC QN AUTO: 30.8 PG (ref 27–33)
MCHC RBC AUTO-ENTMCNC: 35.3 G/DL (ref 33.7–35.3)
MCV RBC AUTO: 87.2 FL (ref 81.4–97.8)
MONOCYTES # BLD AUTO: 0.44 K/UL (ref 0–0.85)
MONOCYTES NFR BLD AUTO: 5.8 % (ref 0–13.4)
NEUTROPHILS # BLD AUTO: 4.48 K/UL (ref 1.82–7.42)
NEUTROPHILS NFR BLD: 58.7 % (ref 44–72)
NRBC # BLD AUTO: 0 K/UL
NRBC BLD-RTO: 0 /100 WBC
PLATELET # BLD AUTO: 274 K/UL (ref 164–446)
PMV BLD AUTO: 10.3 FL (ref 9–12.9)
PROT SERPL-MCNC: 7.6 G/DL (ref 6–8.2)
RBC # BLD AUTO: 5.16 M/UL (ref 4.7–6.1)
WBC # BLD AUTO: 7.6 K/UL (ref 4.8–10.8)

## 2020-01-29 PROCEDURE — 80076 HEPATIC FUNCTION PANEL: CPT

## 2020-01-29 PROCEDURE — 83690 ASSAY OF LIPASE: CPT

## 2020-01-29 PROCEDURE — 83516 IMMUNOASSAY NONANTIBODY: CPT

## 2020-01-29 PROCEDURE — 82784 ASSAY IGA/IGD/IGG/IGM EACH: CPT

## 2020-01-29 PROCEDURE — 85025 COMPLETE CBC W/AUTO DIFF WBC: CPT

## 2020-01-29 PROCEDURE — 87522 HEPATITIS C REVRS TRNSCRPJ: CPT

## 2020-01-29 PROCEDURE — 36415 COLL VENOUS BLD VENIPUNCTURE: CPT

## 2020-01-30 ENCOUNTER — HOSPITAL ENCOUNTER (OUTPATIENT)
Facility: MEDICAL CENTER | Age: 45
End: 2020-01-30
Attending: INTERNAL MEDICINE
Payer: COMMERCIAL

## 2020-01-30 LAB
C DIFF DNA SPEC QL NAA+PROBE: NEGATIVE
C DIFF TOX GENS STL QL NAA+PROBE: NEGATIVE
G LAMBLIA+C PARVUM AG STL QL RAPID: NORMAL
SIGNIFICANT IND 70042: NORMAL
SITE SITE: NORMAL
SOURCE SOURCE: NORMAL

## 2020-01-30 PROCEDURE — 87329 GIARDIA AG IA: CPT

## 2020-01-30 PROCEDURE — 87493 C DIFF AMPLIFIED PROBE: CPT

## 2020-01-30 PROCEDURE — 87328 CRYPTOSPORIDIUM AG IA: CPT

## 2020-01-31 LAB
HCV RNA SERPL NAA+PROBE-ACNC: NOT DETECTED IU/ML
HCV RNA SERPL NAA+PROBE-LOG IU: NOT DETECTED LOG IU/ML
HCV RNA SERPL QL NAA+PROBE: NOT DETECTED
IGA SERPL-MCNC: 195 MG/DL (ref 68–408)
TTG IGA SER IA-ACNC: 1 U/ML (ref 0–3)

## 2020-04-14 ENCOUNTER — OFFICE VISIT (OUTPATIENT)
Dept: URGENT CARE | Facility: CLINIC | Age: 45
End: 2020-04-14
Payer: COMMERCIAL

## 2020-04-14 ENCOUNTER — HOSPITAL ENCOUNTER (OUTPATIENT)
Dept: LAB | Facility: MEDICAL CENTER | Age: 45
End: 2020-04-14
Attending: PHYSICIAN ASSISTANT
Payer: COMMERCIAL

## 2020-04-14 VITALS
RESPIRATION RATE: 20 BRPM | OXYGEN SATURATION: 95 % | HEART RATE: 74 BPM | WEIGHT: 203 LBS | SYSTOLIC BLOOD PRESSURE: 126 MMHG | DIASTOLIC BLOOD PRESSURE: 90 MMHG | HEIGHT: 72 IN | BODY MASS INDEX: 27.5 KG/M2 | TEMPERATURE: 98.5 F

## 2020-04-14 DIAGNOSIS — R14.0 BLOATING: ICD-10-CM

## 2020-04-14 DIAGNOSIS — R10.10 UPPER ABDOMINAL PAIN: ICD-10-CM

## 2020-04-14 PROCEDURE — 99214 OFFICE O/P EST MOD 30 MIN: CPT | Performed by: PHYSICIAN ASSISTANT

## 2020-04-14 PROCEDURE — 83013 H PYLORI (C-13) BREATH: CPT

## 2020-04-14 ASSESSMENT — ENCOUNTER SYMPTOMS
CHILLS: 0
HEMATOCHEZIA: 0
CONSTIPATION: 0
FLATUS: 1
COUGH: 0
BELCHING: 1
NAUSEA: 0
FEVER: 0
FALLS: 0
DIARRHEA: 0
BLOOD IN STOOL: 0
MYALGIAS: 0
HEARTBURN: 0
SHORTNESS OF BREATH: 0
ABDOMINAL PAIN: 1
VOMITING: 1

## 2020-04-14 ASSESSMENT — FIBROSIS 4 INDEX: FIB4 SCORE: 0.88

## 2020-04-14 NOTE — PROGRESS NOTES
"Subjective:      Raheel Kitchen Jr. is a 44 y.o. male who presents with Abdominal Pain (Last visit: 12/26/19, top side of abdomen and radiates just below it, changed diet for it: choses food to feel better, feels a lot of pain, worsens, vomited, burping, went to GI)          Patient is a 44-year-old male who presents to urgent care with continued upper abdominal pain for the last several months.  Patient updates me and reports that he has had previous evaluation in urgent care of which he underwent an x-ray which was normal.  At that time patient was referred to GI of which he is a current patient of Dr. Patiño.  Patient underwent evaluation to include endoscopy and a colonoscopy of which at that time appeared to be \"normal\".  He then proceeded with various lab work of which appeared to be normal.  It was suggested that patient have a gastric emptying study however this is not scheduled until August.  It was also suggested that patient have follow-up with an allergist to discussed possible food allergies.  Patient expresses frustration today as he consistently will have flares of the abdominal pain mainly to his mid abdomen with radiation across the right and left sides.  He reports 4 days ago a severe episode uncertain what he ate although does report that he had bread, sweet tea, cereal etc. he is uncertain what his trigger although does report difficulty with any type of meat, red meat, pork, butter, dairy and Oreos.  Patient does drink alcohol-last drink was yesterday of which he had 2 cocktails of which this did not appear to aggravate his symptoms.  He does report symptoms the majority of the time however increase of pain based on what he eats.  He does report that he has had 2 drastically changed his diet since December however he continues to have various foods that will trigger such bloating, pain, burping.  Last bowel movement was this morning and was normal and did not alleviate or aggravate symptoms.  He has " had history of episodes of vomiting however none at this time.  He denies further imaging with his PCP or GI.  He does have history of esophagitis and what appears that he may have needed an esophageal dilation as well.  Abdominal Pain   This is a recurrent problem. The current episode started more than 1 month ago. The onset quality is gradual. The problem occurs intermittently. The problem has been waxing and waning. The pain is located in the epigastric region, LUQ and RUQ. The pain is at a severity of 3/10. The pain is mild. The quality of the pain is cramping and sharp. The abdominal pain does not radiate. Associated symptoms include belching, flatus and vomiting. Pertinent negatives include no constipation, diarrhea, dysuria, fever, frequency, hematochezia, melena, myalgias or nausea. Exacerbated by: As above. Relieved by: As above. He has tried nothing for the symptoms. Prior diagnostic workup includes GI consult, lower endoscopy and upper endoscopy. History of an appendectomy       Review of Systems   Constitutional: Negative for chills and fever.   HENT: Negative for congestion.    Respiratory: Negative for cough and shortness of breath.    Gastrointestinal: Positive for abdominal pain, flatus and vomiting. Negative for blood in stool, constipation, diarrhea, heartburn, hematochezia, melena and nausea.   Genitourinary: Negative for dysuria and frequency.   Musculoskeletal: Negative for falls and myalgias.   Skin: Negative for rash.   All other systems reviewed and are negative.         Objective:     /90 (BP Location: Left arm, Patient Position: Sitting, BP Cuff Size: Adult long)   Pulse 74   Temp 36.9 °C (98.5 °F) (Temporal)   Resp 20   Ht 1.829 m (6')   Wt 92.1 kg (203 lb)   SpO2 95%   BMI 27.53 kg/m²    PMH:  has a past medical history of Asthma, Esophageal dilatation, Esophageal ring, and Esophageal stricture. He also has no past medical history of Diabetes (HCC).  MEDS: Reviewed .    ALLERGIES: No Known Allergies  SURGHX:   Past Surgical History:   Procedure Laterality Date   • GASTROSCOPY WITH FOREIGN BODY REMOVAL  10/5/2016    Procedure: GASTROSCOPY WITH FOREIGN BODY REMOVAL;  Surgeon: William Mayen M.D.;  Location: SURGERY [facility];  Service:    • APPENDECTOMY       SOCHX:  reports that he has never smoked. He has never used smokeless tobacco. He reports current alcohol use. He reports that he does not use drugs.  FH: Family history was reviewed, no pertinent findings to report    Physical Exam  Vitals signs reviewed.   Constitutional:       General: He is not in acute distress.     Appearance: He is well-developed.   HENT:      Head: Normocephalic and atraumatic.      Right Ear: External ear normal.      Left Ear: External ear normal.      Mouth/Throat:      Pharynx: No oropharyngeal exudate.   Eyes:      Conjunctiva/sclera: Conjunctivae normal.      Pupils: Pupils are equal, round, and reactive to light.   Neck:      Musculoskeletal: Normal range of motion and neck supple.      Trachea: No tracheal deviation.   Cardiovascular:      Rate and Rhythm: Normal rate and regular rhythm.      Heart sounds: No murmur.   Pulmonary:      Effort: Pulmonary effort is normal. No respiratory distress.      Breath sounds: Normal breath sounds.   Abdominal:      General: Bowel sounds are normal.      Tenderness: There is abdominal tenderness in the right upper quadrant, epigastric area and left upper quadrant. There is no right CVA tenderness, left CVA tenderness, guarding or rebound.          Comments: Mild tenderness to the RUQ, epigastrium, and LUQ.    Musculoskeletal: Normal range of motion.   Skin:     General: Skin is warm.      Findings: No rash.   Neurological:      Mental Status: He is alert and oriented to person, place, and time.      Coordination: Coordination normal.   Psychiatric:         Behavior: Behavior normal.         Thought Content: Thought content normal.          Judgment: Judgment normal.                 Assessment/Plan:       1. Upper abdominal pain  - US-ABDOMEN COMPLETE SURVEY; Future  - H. PYLORI, UREA BREATH TEST, ADULT; Future    2. Bloating    Appears that patient has had extensive blood work with GI to include negative hepatitis, negative lipase, LFTs appeared to be normal,And stool samples are negative for crypto along with C. difficile and Giardia.    Will order the above- however pt. ate prior to arrival.  Ultrasound is for tomorrow.  Also test for H. pylori uncertain if this was tested during endoscopy however these results are not known.  Discussed even in the presence of an ultrasound patient may be need HIDA in the future however will defer any further imaging to his GI specialist at this time.  Of course we will treat H. pylori if indicated.  Continue with diet changes at this time.   I will follow up with this patient as results return.   Patient and/or guardian given precautionary s/sx that mandate immediate follow up and evaluation in the ED. Advised of risks of not doing so.  Side effects of the above medications discussed.   DDX, Supportive care, and indications for immediate follow-up discussed with patient.    Instructed to return to clinic or nearest emergency department if we are not available for any change in condition, further concerns, or worsening of symptoms.    The patient and/or guardian demonstrated a good understanding and agreed with the treatment plan.    Please note that this dictation was created using voice recognition software. I have made every reasonable attempt to correct obvious errors, but I expect that there are errors of grammar and possibly content that I did not discover before finalizing the note.

## 2020-04-15 ENCOUNTER — HOSPITAL ENCOUNTER (OUTPATIENT)
Dept: RADIOLOGY | Facility: MEDICAL CENTER | Age: 45
End: 2020-04-15
Attending: PHYSICIAN ASSISTANT
Payer: COMMERCIAL

## 2020-04-15 DIAGNOSIS — R10.10 UPPER ABDOMINAL PAIN: ICD-10-CM

## 2020-04-15 PROCEDURE — 76700 US EXAM ABDOM COMPLETE: CPT

## 2020-04-16 LAB — UREA BREATH TEST QL: NEGATIVE

## 2020-08-25 ENCOUNTER — HOSPITAL ENCOUNTER (OUTPATIENT)
Dept: RADIOLOGY | Facility: MEDICAL CENTER | Age: 45
End: 2020-08-25
Attending: INTERNAL MEDICINE
Payer: COMMERCIAL